# Patient Record
Sex: FEMALE | Race: WHITE | Employment: PART TIME | URBAN - METROPOLITAN AREA
[De-identification: names, ages, dates, MRNs, and addresses within clinical notes are randomized per-mention and may not be internally consistent; named-entity substitution may affect disease eponyms.]

---

## 2017-01-10 ENCOUNTER — TELEPHONE (OUTPATIENT)
Dept: INTERNAL MEDICINE CLINIC | Age: 52
End: 2017-01-10

## 2017-01-10 RX ORDER — PREDNISONE 10 MG/1
TABLET ORAL
Qty: 21 TABLET | Refills: 0 | Status: SHIPPED | OUTPATIENT
Start: 2017-01-10 | End: 2017-01-20

## 2017-01-26 ENCOUNTER — OFFICE VISIT (OUTPATIENT)
Dept: ENDOCRINOLOGY | Age: 52
End: 2017-01-26

## 2017-01-26 VITALS
SYSTOLIC BLOOD PRESSURE: 112 MMHG | HEIGHT: 68 IN | OXYGEN SATURATION: 98 % | DIASTOLIC BLOOD PRESSURE: 70 MMHG | BODY MASS INDEX: 25.1 KG/M2 | WEIGHT: 165.6 LBS | HEART RATE: 91 BPM

## 2017-01-26 DIAGNOSIS — E55.9 VITAMIN D DEFICIENCY: ICD-10-CM

## 2017-01-26 DIAGNOSIS — R23.2 HOT FLASHES: ICD-10-CM

## 2017-01-26 DIAGNOSIS — Z83.3 FAMILY HISTORY OF DIABETES MELLITUS (DM): ICD-10-CM

## 2017-01-26 DIAGNOSIS — E78.00 PURE HYPERCHOLESTEROLEMIA: Primary | ICD-10-CM

## 2017-01-26 PROCEDURE — 99214 OFFICE O/P EST MOD 30 MIN: CPT | Performed by: INTERNAL MEDICINE

## 2017-01-26 RX ORDER — ICOSAPENT ETHYL 1000 MG/1
2 CAPSULE ORAL 2 TIMES DAILY
Qty: 360 CAPSULE | Refills: 3 | Status: SHIPPED | OUTPATIENT
Start: 2017-01-26 | End: 2017-06-19 | Stop reason: SDUPTHER

## 2017-01-26 RX ORDER — ERGOCALCIFEROL 1.25 MG/1
CAPSULE ORAL
Qty: 26 CAPSULE | Refills: 1 | Status: SHIPPED | OUTPATIENT
Start: 2017-01-26 | End: 2017-03-30 | Stop reason: SDUPTHER

## 2017-01-26 RX ORDER — TOPIRAMATE 25 MG/1
CAPSULE, EXTENDED RELEASE ORAL
COMMUNITY
Start: 2016-12-21 | End: 2017-06-01

## 2017-01-26 ASSESSMENT — PATIENT HEALTH QUESTIONNAIRE - PHQ9
SUM OF ALL RESPONSES TO PHQ QUESTIONS 1-9: 0
1. LITTLE INTEREST OR PLEASURE IN DOING THINGS: 0
SUM OF ALL RESPONSES TO PHQ9 QUESTIONS 1 & 2: 0
2. FEELING DOWN, DEPRESSED OR HOPELESS: 0

## 2017-02-06 RX ORDER — DICYCLOMINE HYDROCHLORIDE 10 MG/1
CAPSULE ORAL
Qty: 270 CAPSULE | Refills: 0 | Status: SHIPPED | OUTPATIENT
Start: 2017-02-06 | End: 2017-03-31 | Stop reason: SDUPTHER

## 2017-02-28 PROBLEM — Z00.00 PREVENTATIVE HEALTH CARE: Status: ACTIVE | Noted: 2017-02-28

## 2017-02-28 ASSESSMENT — ENCOUNTER SYMPTOMS
SHORTNESS OF BREATH: 0
ABDOMINAL PAIN: 0

## 2017-03-08 ENCOUNTER — TELEPHONE (OUTPATIENT)
Dept: INTERNAL MEDICINE CLINIC | Age: 52
End: 2017-03-08

## 2017-03-30 DIAGNOSIS — E55.9 VITAMIN D DEFICIENCY: ICD-10-CM

## 2017-03-30 RX ORDER — ERGOCALCIFEROL 1.25 MG/1
CAPSULE ORAL
Qty: 20 CAPSULE | Refills: 1 | Status: SHIPPED | OUTPATIENT
Start: 2017-03-30 | End: 2017-06-19 | Stop reason: SDUPTHER

## 2017-03-31 ENCOUNTER — OFFICE VISIT (OUTPATIENT)
Dept: INTERNAL MEDICINE CLINIC | Age: 52
End: 2017-03-31

## 2017-03-31 VITALS
SYSTOLIC BLOOD PRESSURE: 118 MMHG | WEIGHT: 159 LBS | HEART RATE: 88 BPM | HEIGHT: 68 IN | BODY MASS INDEX: 24.1 KG/M2 | DIASTOLIC BLOOD PRESSURE: 72 MMHG | RESPIRATION RATE: 16 BRPM

## 2017-03-31 DIAGNOSIS — E78.00 PURE HYPERCHOLESTEROLEMIA: Primary | ICD-10-CM

## 2017-03-31 DIAGNOSIS — Z00.00 PREVENTATIVE HEALTH CARE: ICD-10-CM

## 2017-03-31 DIAGNOSIS — G43.019 MIGRAINE WITHOUT AURA, INTRACTABLE: ICD-10-CM

## 2017-03-31 PROCEDURE — 99396 PREV VISIT EST AGE 40-64: CPT | Performed by: INTERNAL MEDICINE

## 2017-03-31 RX ORDER — AZELASTINE 1 MG/ML
SPRAY, METERED NASAL
Qty: 1 BOTTLE | Refills: 5 | Status: SHIPPED | OUTPATIENT
Start: 2017-03-31 | End: 2018-06-21 | Stop reason: SDUPTHER

## 2017-03-31 RX ORDER — FLUTICASONE PROPIONATE 50 MCG
SPRAY, SUSPENSION (ML) NASAL
Qty: 1 BOTTLE | Refills: 5 | Status: SHIPPED | OUTPATIENT
Start: 2017-03-31 | End: 2018-05-14 | Stop reason: SDUPTHER

## 2017-03-31 RX ORDER — DICYCLOMINE HYDROCHLORIDE 10 MG/1
CAPSULE ORAL
Qty: 270 CAPSULE | Refills: 3 | Status: SHIPPED | OUTPATIENT
Start: 2017-03-31 | End: 2018-05-11 | Stop reason: SDUPTHER

## 2017-03-31 ASSESSMENT — ENCOUNTER SYMPTOMS
EYES NEGATIVE: 1
GASTROINTESTINAL NEGATIVE: 1
RESPIRATORY NEGATIVE: 1

## 2017-04-24 ENCOUNTER — TELEPHONE (OUTPATIENT)
Dept: INTERNAL MEDICINE CLINIC | Age: 52
End: 2017-04-24

## 2017-04-24 RX ORDER — TRAMADOL HYDROCHLORIDE 50 MG/1
50 TABLET ORAL 2 TIMES DAILY PRN
Qty: 30 TABLET | Refills: 0 | Status: SHIPPED | OUTPATIENT
Start: 2017-04-24 | End: 2020-10-13

## 2017-04-27 ENCOUNTER — TELEPHONE (OUTPATIENT)
Dept: INTERNAL MEDICINE CLINIC | Age: 52
End: 2017-04-27

## 2017-04-27 RX ORDER — METHYLPREDNISOLONE 4 MG/1
TABLET ORAL
Qty: 1 KIT | Refills: 0 | Status: SHIPPED | OUTPATIENT
Start: 2017-04-27 | End: 2017-05-01 | Stop reason: SDUPTHER

## 2017-04-28 ENCOUNTER — HOSPITAL ENCOUNTER (OUTPATIENT)
Dept: MAMMOGRAPHY | Age: 52
Discharge: HOME | End: 2017-04-28
Attending: INTERNAL MEDICINE | Admitting: INTERNAL MEDICINE

## 2017-04-28 DIAGNOSIS — Z12.31 VISIT FOR SCREENING MAMMOGRAM: ICD-10-CM

## 2017-05-01 ENCOUNTER — TELEPHONE (OUTPATIENT)
Dept: INTERNAL MEDICINE CLINIC | Age: 52
End: 2017-05-01

## 2017-05-01 RX ORDER — METHYLPREDNISOLONE 4 MG/1
TABLET ORAL
Qty: 1 KIT | Refills: 0 | Status: SHIPPED | OUTPATIENT
Start: 2017-05-01 | End: 2017-05-07

## 2017-05-17 ENCOUNTER — TELEPHONE (OUTPATIENT)
Dept: ENDOCRINOLOGY | Age: 52
End: 2017-05-17

## 2017-05-26 ENCOUNTER — TELEPHONE (OUTPATIENT)
Dept: ENDOCRINOLOGY | Age: 52
End: 2017-05-26

## 2017-06-01 ENCOUNTER — OFFICE VISIT (OUTPATIENT)
Dept: ENDOCRINOLOGY | Age: 52
End: 2017-06-01

## 2017-06-01 VITALS
BODY MASS INDEX: 23.13 KG/M2 | SYSTOLIC BLOOD PRESSURE: 108 MMHG | HEIGHT: 68 IN | HEART RATE: 77 BPM | OXYGEN SATURATION: 99 % | WEIGHT: 152.6 LBS | DIASTOLIC BLOOD PRESSURE: 74 MMHG

## 2017-06-01 DIAGNOSIS — E55.9 VITAMIN D DEFICIENCY: ICD-10-CM

## 2017-06-01 DIAGNOSIS — Z00.00 PREVENTATIVE HEALTH CARE: Primary | ICD-10-CM

## 2017-06-01 DIAGNOSIS — E78.00 PURE HYPERCHOLESTEROLEMIA: ICD-10-CM

## 2017-06-01 DIAGNOSIS — N95.1 MENOPAUSAL STATE: ICD-10-CM

## 2017-06-01 PROCEDURE — 99214 OFFICE O/P EST MOD 30 MIN: CPT | Performed by: INTERNAL MEDICINE

## 2017-06-01 RX ORDER — TOPIRAMATE 50 MG/1
TABLET, FILM COATED ORAL
COMMUNITY
Start: 2017-05-30

## 2017-06-01 RX ORDER — SUMATRIPTAN 50 MG/1
50 TABLET, FILM COATED ORAL
COMMUNITY

## 2017-06-01 ASSESSMENT — PATIENT HEALTH QUESTIONNAIRE - PHQ9
1. LITTLE INTEREST OR PLEASURE IN DOING THINGS: 0
SUM OF ALL RESPONSES TO PHQ QUESTIONS 1-9: 0
SUM OF ALL RESPONSES TO PHQ9 QUESTIONS 1 & 2: 0
2. FEELING DOWN, DEPRESSED OR HOPELESS: 0

## 2017-06-19 DIAGNOSIS — E55.9 VITAMIN D DEFICIENCY: ICD-10-CM

## 2017-06-19 RX ORDER — ERGOCALCIFEROL 1.25 MG/1
CAPSULE ORAL
Qty: 6 CAPSULE | Refills: 1 | Status: SHIPPED | OUTPATIENT
Start: 2017-06-19

## 2017-06-19 RX ORDER — ICOSAPENT ETHYL 1000 MG/1
2 CAPSULE ORAL 2 TIMES DAILY
Qty: 120 CAPSULE | Refills: 1 | Status: SHIPPED | OUTPATIENT
Start: 2017-06-19

## 2017-07-11 ENCOUNTER — TELEPHONE (OUTPATIENT)
Dept: DERMATOLOGY | Age: 52
End: 2017-07-11

## 2017-08-31 ENCOUNTER — OFFICE VISIT (OUTPATIENT)
Dept: INTERNAL MEDICINE CLINIC | Age: 52
End: 2017-08-31

## 2017-08-31 VITALS
BODY MASS INDEX: 23.95 KG/M2 | DIASTOLIC BLOOD PRESSURE: 70 MMHG | SYSTOLIC BLOOD PRESSURE: 130 MMHG | WEIGHT: 158 LBS | HEART RATE: 86 BPM | RESPIRATION RATE: 16 BRPM | HEIGHT: 68 IN

## 2017-08-31 DIAGNOSIS — R51.9 RIGHT FACIAL PAIN: Primary | ICD-10-CM

## 2017-08-31 PROCEDURE — 99213 OFFICE O/P EST LOW 20 MIN: CPT | Performed by: INTERNAL MEDICINE

## 2017-08-31 RX ORDER — METHYLPREDNISOLONE 4 MG/1
TABLET ORAL
Qty: 1 KIT | Refills: 0 | Status: SHIPPED | OUTPATIENT
Start: 2017-08-31 | End: 2017-09-06

## 2017-10-17 ENCOUNTER — TELEPHONE (OUTPATIENT)
Dept: ENDOCRINOLOGY | Age: 52
End: 2017-10-17

## 2017-10-17 NOTE — TELEPHONE ENCOUNTER
Needs someone today to call and leave these numbers on voice mail if she doesn't answer  LDL   HDL  Total  Trygliserides

## 2017-10-24 ENCOUNTER — TELEPHONE (OUTPATIENT)
Dept: INTERNAL MEDICINE CLINIC | Age: 52
End: 2017-10-24

## 2017-10-24 NOTE — TELEPHONE ENCOUNTER
Patient is calling to let you know she had her flu, shingles and Tdap done this year at [facility] but she does not know the dates. ..

## 2017-12-04 ENCOUNTER — TELEPHONE (OUTPATIENT)
Dept: INTERNAL MEDICINE CLINIC | Age: 52
End: 2017-12-04

## 2017-12-04 RX ORDER — ESCITALOPRAM OXALATE 10 MG/1
10 TABLET ORAL EVERY MORNING
Qty: 30 TABLET | Refills: 3 | Status: SHIPPED | OUTPATIENT
Start: 2017-12-04 | End: 2020-02-19

## 2017-12-04 NOTE — TELEPHONE ENCOUNTER
Pt states she has been dealing with issues with her 80year old mom, and other family issues. She said she feels very stressed and she cries when she showers. She said that her IBS is flaring up and she has had migraines as well, from the stress.  She is asking if Dr Sanjuanita Brooks can order something for the stress    Pharmacy:  [address] [place] [address] - F [phone]      #[phone]

## 2018-04-10 ENCOUNTER — TELEPHONE (OUTPATIENT)
Dept: INTERNAL MEDICINE CLINIC | Age: 53
End: 2018-04-10

## 2018-04-10 RX ORDER — AMOXICILLIN 500 MG/1
500 CAPSULE ORAL 3 TIMES DAILY
Qty: 15 CAPSULE | Refills: 0 | Status: SHIPPED | OUTPATIENT
Start: 2018-04-10 | End: 2018-04-15

## 2018-05-01 ENCOUNTER — HOSPITAL ENCOUNTER (OUTPATIENT)
Dept: MAMMOGRAPHY | Age: 53
Discharge: HOME | End: 2018-05-01
Attending: OBSTETRICS & GYNECOLOGY | Admitting: OBSTETRICS & GYNECOLOGY

## 2018-05-01 DIAGNOSIS — Z12.31 ENCOUNTER FOR SCREENING MAMMOGRAM FOR BREAST CANCER: ICD-10-CM

## 2018-05-14 RX ORDER — FLUTICASONE PROPIONATE 50 MCG
SPRAY, SUSPENSION (ML) NASAL
Qty: 1 BOTTLE | Refills: 5 | Status: SHIPPED | OUTPATIENT
Start: 2018-05-14 | End: 2019-02-06

## 2018-05-14 RX ORDER — FLUTICASONE PROPIONATE 50 MCG
SPRAY, SUSPENSION (ML) NASAL
Qty: 1 BOTTLE | Refills: 5 | Status: SHIPPED | OUTPATIENT
Start: 2018-05-14 | End: 2019-02-06 | Stop reason: SDUPTHER

## 2018-06-13 ENCOUNTER — OFFICE VISIT (OUTPATIENT)
Dept: DERMATOLOGY | Age: 53
End: 2018-06-13

## 2018-06-13 DIAGNOSIS — L28.0 LICHEN SIMPLEX CHRONICUS: Primary | ICD-10-CM

## 2018-06-13 DIAGNOSIS — D23.30 DERMAL NEVUS OF FACE: ICD-10-CM

## 2018-06-13 PROCEDURE — 99214 OFFICE O/P EST MOD 30 MIN: CPT | Performed by: DERMATOLOGY

## 2018-06-13 RX ORDER — TRIAMCINOLONE ACETONIDE 1 MG/G
CREAM TOPICAL
Qty: 30 G | Refills: 0 | Status: SHIPPED | OUTPATIENT
Start: 2018-06-13 | End: 2020-10-13

## 2018-06-21 RX ORDER — AZELASTINE 1 MG/ML
SPRAY, METERED NASAL
Qty: 1 BOTTLE | Refills: 4 | Status: SHIPPED | OUTPATIENT
Start: 2018-06-21 | End: 2019-08-06 | Stop reason: SDUPTHER

## 2018-08-09 RX ORDER — DICYCLOMINE HYDROCHLORIDE 10 MG/1
CAPSULE ORAL
Qty: 270 CAPSULE | Refills: 2 | Status: SHIPPED | OUTPATIENT
Start: 2018-08-09 | End: 2018-11-05 | Stop reason: SDUPTHER

## 2018-09-26 PROBLEM — Z00.00 PREVENTATIVE HEALTH CARE: Status: RESOLVED | Noted: 2017-02-28 | Resolved: 2018-09-26

## 2018-11-19 ENCOUNTER — OFFICE VISIT (OUTPATIENT)
Dept: ORTHOPEDIC SURGERY | Age: 53
End: 2018-11-19
Payer: COMMERCIAL

## 2018-11-19 VITALS
BODY MASS INDEX: 23.64 KG/M2 | SYSTOLIC BLOOD PRESSURE: 134 MMHG | DIASTOLIC BLOOD PRESSURE: 85 MMHG | HEART RATE: 93 BPM | HEIGHT: 68 IN | WEIGHT: 156 LBS

## 2018-11-19 DIAGNOSIS — R52 PAIN: Primary | ICD-10-CM

## 2018-11-19 PROCEDURE — 99213 OFFICE O/P EST LOW 20 MIN: CPT | Performed by: PHYSICIAN ASSISTANT

## 2018-11-19 RX ORDER — METHYLPREDNISOLONE 4 MG/1
TABLET ORAL
Qty: 1 KIT | Refills: 0 | Status: SHIPPED | OUTPATIENT
Start: 2018-11-19 | End: 2018-11-25

## 2018-11-28 ENCOUNTER — HOSPITAL ENCOUNTER (OUTPATIENT)
Dept: PHYSICAL THERAPY | Age: 53
Setting detail: THERAPIES SERIES
Discharge: HOME | End: 2018-11-28
Payer: COMMERCIAL

## 2018-11-28 PROCEDURE — 97161 PT EVAL LOW COMPLEX 20 MIN: CPT | Performed by: PHYSICAL THERAPIST

## 2018-11-28 PROCEDURE — 97140 MANUAL THERAPY 1/> REGIONS: CPT | Performed by: PHYSICAL THERAPIST

## 2018-11-28 PROCEDURE — G8985 CARRY GOAL STATUS: HCPCS | Performed by: PHYSICAL THERAPIST

## 2018-11-28 PROCEDURE — G8984 CARRY CURRENT STATUS: HCPCS | Performed by: PHYSICAL THERAPIST

## 2018-11-28 PROCEDURE — 97110 THERAPEUTIC EXERCISES: CPT | Performed by: PHYSICAL THERAPIST

## 2018-11-28 NOTE — FLOWSHEET NOTE
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, LBP S78.4  Insurance/Certification information:  PT Insurance Information: 18 - HU[employer] - $30CP - 100% - 20PT - NO AUTH  Physician Information:  Referring Practitioner: Richard Vines [address] [place] [address] signed (Y/N):     Date of Patient follow up with Physician:     G-Code (if applicable):      Date G-Code Applied:  18  PT G-Codes  Functional Assessment Tool Used: LEFS  Score: 40%  Functional Limitation: Carrying, moving and handling objects  Carrying, Moving and Handling Objects Current Status (): At least 40 percent but less than 60 percent impaired, limited or restricted  Carrying, Moving and Handling Objects Goal Status ():  At least 20 percent but less than 40 percent impaired, limited or restricted    Progress Note: [x]  Yes  []  No  Next due by: Visit #10       Latex Allergy:  [x]NO      []YES  Preferred Language for Healthcare:   [x]English       []other:    Visit # Insurance Allowable Requires auth   1 20    [x]no        []yes:       Pain level:  5/10     SUBJECTIVE:  See eval    OBJECTIVE: See eval  Observation:   Test measurements:      RESTRICTIONS/PRECAUTIONS: cervical fusion C5-6-7    Exercises/Interventions:     Therapeutic Ex Sets/sec Notes   Self correction  5\" x10 HEP   Supine clam B, R, L w/ TA Blue TB x15 ea HEP   SL clam, rev clam x15 ea R/L HEP                                      Pt ed: POC, HEP, activity mod, SI belt use, posture, lifting mechanics, heat vs ice, foot wear x15'    Manual Intervention     STM L>R paraspinals, L SI

## 2018-11-28 NOTE — PLAN OF CARE
with Lumbar instability/stabilization subgroup. []Signs/symptoms consistent with Lumbar mobilization/manipulation subgroup, myotomes and dermatomes intact. Meets manipulation criteria. []Signs/symptoms consistent with Lumbar direction specific/centralization subgroup   []Signs/symptoms consistent with Lumbar traction subgroup     []Signs/symptoms consistent with lumbar facet dysfunction   []Signs/symptoms consistent with lumbar stenosis type dysfunction   []Signs/symptoms consistent with nerve root involvement including myotome & dermatome dysfunction   []Signs/symptoms consistent with post-surgical status including: decreased ROM, strength and function. []signs/symptoms consistent with pathology which may benefit from Dry needling     []other:      Prognosis/Rehab Potential:      []Excellent   [x]Good    []Fair   []Poor    Tolerance of evaluation/treatment:    []Excellent   [x]Good    []Fair   []Poor  Physical Therapy Evaluation Complexity Justification  [x] A history of present problem with:  [] no personal factors and/or comorbidities that impact the plan of care;  [x]1-2 personal factors and/or comorbidities that impact the plan of care  []3 personal factors and/or comorbidities that impact the plan of care  [x] An examination of body systems using standardized tests and measures addressing any of the following: body structures and functions (impairments), activity limitations, and/or participation restrictions;:  [x] a total of 1-2 or more elements   [] a total of 3 or more elements   [] a total of 4 or more elements   [x] A clinical presentation with:  [x] stable and/or uncomplicated characteristics   [] evolving clinical presentation with changing characteristics  [] unstable and unpredictable characteristics;   [x] Clinical decision making of [x] low, [] moderate, [] high complexity using standardized patient assessment instrument and/or measurable assessment of functional outcome.     [x] EVAL

## 2018-12-04 ENCOUNTER — HOSPITAL ENCOUNTER (OUTPATIENT)
Dept: PHYSICAL THERAPY | Age: 53
Setting detail: THERAPIES SERIES
Discharge: HOME | End: 2018-12-04
Payer: COMMERCIAL

## 2018-12-04 PROCEDURE — 97110 THERAPEUTIC EXERCISES: CPT | Performed by: PHYSICAL THERAPIST

## 2018-12-04 PROCEDURE — 97140 MANUAL THERAPY 1/> REGIONS: CPT | Performed by: PHYSICAL THERAPIST

## 2018-12-04 PROCEDURE — 97112 NEUROMUSCULAR REEDUCATION: CPT | Performed by: PHYSICAL THERAPIST

## 2018-12-05 NOTE — FLOWSHEET NOTE
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, LBP H99.8  Insurance/Certification information:  PT Insurance Information: 18 - [employer] - $30CP - 100% - 20PT - NO AUTH  Physician Information:  Referring Practitioner: Mckenna Galarza [address] [place] [address] signed (Y/N):     Date of Patient follow up with Physician:     G-Code (if applicable):      Date G-Code Applied:  18  PT G-Codes  Functional Assessment Tool Used: LEFS  Score: 40%  Functional Limitation: Carrying, moving and handling objects  Carrying, Moving and Handling Objects Current Status (): At least 40 percent but less than 60 percent impaired, limited or restricted  Carrying, Moving and Handling Objects Goal Status (): At least 20 percent but less than 40 percent impaired, limited or restricted    Progress Note: [x]  Yes  []  No  Next due by: Visit #10       Latex Allergy:  [x]NO      []YES  Preferred Language for Healthcare:   [x]English       []other:    Visit # Insurance Allowable Requires auth   2 20    [x]no        []yes:       Pain level:  5/10     SUBJECTIVE:  Pt reports that she is feeling a lot better. She had a few days off work which helped. She has only needed to wear the stability belt off and on.      OBJECTIVE:   Observation:   Test measurements:  + supine to sit R LE long - short   RESTRICTIONS/PRECAUTIONS: cervical fusion C5-6-7    Exercises/Interventions:     Therapeutic Ex Sets/sec Notes   Self correction  5\" x10 HEP   Supine clam B, R, L w/ TA Green VSL x15 ea HEP   SL clam, rev clam x15 ea R/L HEP   Standing R hip flex return to lifting and carrying functional activities without increased symptoms or restriction. 5. Pt will be able to resume exercise progression at the gym without increased pain or instability. Progression Towards Functional goals:  [x] Patient is progressing as expected towards functional goals listed. [] Progression is slowed due to complexities listed. [] Progression has been slowed due to co-morbidities. [] Plan just implemented, too soon to assess goals progression  [] Other:     ASSESSMENT:  Pt had trouble isolating TrA and keeping pelvis stable. When she would activate gluts instead she got R SI pain. Overall, decrease pain and responded well to manuals.       Treatment/Activity Tolerance:  [x] Patient tolerated treatment well [] Patient limited by fatique  [] Patient limited by pain  [] Patient limited by other medical complications  [] Other:     Prognosis: [x] Good [] Fair  [] Poor    Patient Requires Follow-up: [x] Yes  [] No    PLAN: See michelle, 1-2x/week with HEP progressions, monitor SI stability  [x] Continue per plan of care [] Alter current plan (see comments)  [] Plan of care initiated [] Hold pending MD visit [] Discharge    Electronically signed by: Darryn Esposito PT, DPT #[id number]

## 2018-12-19 ENCOUNTER — HOSPITAL ENCOUNTER (OUTPATIENT)
Dept: PHYSICAL THERAPY | Age: 53
Setting detail: THERAPIES SERIES
Discharge: HOME | End: 2018-12-19
Payer: COMMERCIAL

## 2018-12-19 PROCEDURE — 97110 THERAPEUTIC EXERCISES: CPT | Performed by: PHYSICAL THERAPIST

## 2018-12-19 PROCEDURE — 97140 MANUAL THERAPY 1/> REGIONS: CPT | Performed by: PHYSICAL THERAPIST

## 2018-12-19 NOTE — FLOWSHEET NOTE
return to lifting and carrying functional activities without increased symptoms or restriction. 5. Pt will be able to resume exercise progression at the gym without increased pain or instability. Progression Towards Functional goals:  [x] Patient is progressing as expected towards functional goals listed. [] Progression is slowed due to complexities listed. [] Progression has been slowed due to co-morbidities. [] Plan just implemented, too soon to assess goals progression  [] Other:     ASSESSMENT:  Improved SI alignment following manuals. Decreased cues for TA and pelvic neutral, but increased with fatigue. Challenged with addition of side plank from knees, but no reported increased pain with progressions.       Treatment/Activity Tolerance:  [x] Patient tolerated treatment well [] Patient limited by fatique  [] Patient limited by pain  [] Patient limited by other medical complications  [] Other:     Prognosis: [x] Good [] Fair  [] Poor    Patient Requires Follow-up: [x] Yes  [] No    PLAN: See michelle, 1-2x/week with HEP progressions, monitor SI stability  [x] Continue per plan of care [] Alter current plan (see comments)  [] Plan of care initiated [] Hold pending MD visit [] Discharge    Electronically signed by: Jaciel Pennington PT, DPT #[id number]

## 2018-12-27 ENCOUNTER — HOSPITAL ENCOUNTER (OUTPATIENT)
Dept: PHYSICAL THERAPY | Age: 53
Setting detail: THERAPIES SERIES
Discharge: HOME | End: 2018-12-27
Payer: COMMERCIAL

## 2018-12-27 PROCEDURE — 97110 THERAPEUTIC EXERCISES: CPT | Performed by: PHYSICAL THERAPIST

## 2018-12-27 PROCEDURE — 97140 MANUAL THERAPY 1/> REGIONS: CPT | Performed by: PHYSICAL THERAPIST

## 2019-01-07 ENCOUNTER — HOSPITAL ENCOUNTER (OUTPATIENT)
Dept: PHYSICAL THERAPY | Age: 54
Setting detail: THERAPIES SERIES
End: 2019-01-07
Payer: COMMERCIAL

## 2019-01-08 ENCOUNTER — HOSPITAL ENCOUNTER (OUTPATIENT)
Dept: PHYSICAL THERAPY | Age: 54
Setting detail: THERAPIES SERIES
Discharge: HOME | End: 2019-01-08
Payer: COMMERCIAL

## 2019-01-08 PROCEDURE — G8984 CARRY CURRENT STATUS: HCPCS | Performed by: PHYSICAL THERAPIST

## 2019-01-08 PROCEDURE — 97110 THERAPEUTIC EXERCISES: CPT | Performed by: PHYSICAL THERAPIST

## 2019-01-08 PROCEDURE — G8985 CARRY GOAL STATUS: HCPCS | Performed by: PHYSICAL THERAPIST

## 2019-01-08 PROCEDURE — 97140 MANUAL THERAPY 1/> REGIONS: CPT | Performed by: PHYSICAL THERAPIST

## 2019-01-14 ENCOUNTER — HOSPITAL ENCOUNTER (OUTPATIENT)
Dept: PHYSICAL THERAPY | Age: 54
Setting detail: THERAPIES SERIES
Discharge: HOME | End: 2019-01-14
Payer: COMMERCIAL

## 2019-01-14 PROCEDURE — 97110 THERAPEUTIC EXERCISES: CPT | Performed by: PHYSICAL THERAPIST

## 2019-01-14 PROCEDURE — 97140 MANUAL THERAPY 1/> REGIONS: CPT | Performed by: PHYSICAL THERAPIST

## 2019-02-06 RX ORDER — FLUTICASONE PROPIONATE 50 MCG
SPRAY, SUSPENSION (ML) NASAL
Qty: 16 G | Refills: 4 | Status: SHIPPED | OUTPATIENT
Start: 2019-02-06 | End: 2019-08-06 | Stop reason: SDUPTHER

## 2019-02-13 PROBLEM — J06.9 URTI (ACUTE UPPER RESPIRATORY INFECTION): Status: ACTIVE | Noted: 2019-02-13

## 2019-02-21 ENCOUNTER — TELEPHONE (OUTPATIENT)
Dept: ORTHOPEDIC SURGERY | Age: 54
End: 2019-02-21

## 2019-03-28 PROBLEM — Z00.00 PREVENTATIVE HEALTH CARE: Status: RESOLVED | Noted: 2017-02-28 | Resolved: 2019-03-28

## 2019-04-25 ENCOUNTER — OFFICE VISIT (OUTPATIENT)
Dept: ORTHOPEDIC SURGERY | Age: 54
End: 2019-04-25
Payer: COMMERCIAL

## 2019-04-25 VITALS
HEART RATE: 79 BPM | HEIGHT: 68 IN | DIASTOLIC BLOOD PRESSURE: 80 MMHG | BODY MASS INDEX: 23.66 KG/M2 | WEIGHT: 156.09 LBS | SYSTOLIC BLOOD PRESSURE: 130 MMHG

## 2019-04-25 DIAGNOSIS — R07.81 RIB PAIN ON RIGHT SIDE: ICD-10-CM

## 2019-04-25 DIAGNOSIS — M25.511 RIGHT SHOULDER PAIN, UNSPECIFIED CHRONICITY: Primary | ICD-10-CM

## 2019-04-25 PROCEDURE — 99213 OFFICE O/P EST LOW 20 MIN: CPT | Performed by: PHYSICIAN ASSISTANT

## 2019-04-25 NOTE — PROGRESS NOTES
CHIEF COMPLAINT:    Chief Complaint   Patient presents with    Shoulder Pain     right clavicle noticed swelling on sunday       HISTORY OF PRESENT ILLNESS:                The patient is a 48 y.o. female   Past Medical History:   Diagnosis Date    Allergic rhinitis     Asthma     Mild, intermittent    Deviated septum 1998    Headache(784.0)     Hyperlipidemia     Osteoarthritis     Thoracic outlet syndrome 1987    TMJ (temporomandibular joint disorder)         The patient presents today for right clavicle evaluation. The patient is left-hand-dominant. She noticed some swelling around her right SC joint on Sunday. She denies any injuries that started the swelling. Since that time, she has noticed some discomfort inferior to her right SC joint. She denies chest pains. She denies any shortness of breath or difficulty taking deep breaths. She has a history of left-sided thoracic outlet syndrome with an associated surgery. She is unsure what they did with the surgery but states that they did not remove her 1st rib. She has a history of C5 through C7 spinal fusion. She also has a history of rheumatoid arthritis. She has treated her right sternoclavicular joint swelling with ice and Aleve. She currently works at the [employer].     The pain assessment was noted & is as follows:  Pain Assessment  Location of Pain: Other (Comment)(clavicle)  Location Modifiers: Right  Severity of Pain: 2  Quality of Pain: Aching, Dull  Duration of Pain: A few minutes  Frequency of Pain: Rarely  Aggravating Factors: Stretching, Bending  Limiting Behavior: Some  Relieving Factors: Rest  Result of Injury: No  Work-Related Injury: No  Are there other pain locations you wish to document?: No]      Past Medical History: Medical history form was reviewed today & can be found in the media tab  Past Medical History:   Diagnosis Date    Allergic rhinitis     Asthma     Mild, intermittent    Deviated septum 1998    Headache(784.0)     Hyperlipidemia     Osteoarthritis     Thoracic outlet syndrome     TMJ (temporomandibular joint disorder)       Past Surgical History:     Past Surgical History:   Procedure Laterality Date    CERVICAL DISCECTOMY  2011    lt C5-6--dr gonzalez      SECTION  5244,0771    COLONOSCOPY  2017    dr white--kaden 202     HYSTERECTOMY  2013    KNEE ARTHROSCOPY  2660,9624    Bilat knees    NASAL SEPTUM SURGERY  1998    OTHER SURGICAL HISTORY  1985    Thoracic Outlet Syndrome    SPINE SURGERY  6322,4340    C6-C7 discectomy/fusion,C5-C6    TONSILLECTOMY AND ADENOIDECTOMY      TONSILLECTOMY AND ADENOIDECTOMY       Current Medications:     Current Outpatient Medications:     fluconazole (DIFLUCAN) 150 MG tablet, Take one tablet oral route today and repeat in 3 days if symptoms persist, Disp: 1 tablet, Rfl: 0    fluticasone (FLONASE) 50 MCG/ACT nasal spray, SPRAY TWO SPRAYS IN EACH NOSTRIL DAILY, Disp: 16 g, Rfl: 4    dicyclomine (BENTYL) 20 MG tablet, Take 0.5 tablets by mouth 3 times daily as needed (ibs), Disp: 23 tablet, Rfl: 3    azelastine (ASTELIN) 0.1 % nasal spray, SPRAY ONE OR TWO SPRAYS IN EACH NOSTRIL TWICE DAILY AS NEEDED FOR ALLERGIES, Disp: 1 Bottle, Rfl: 4    triamcinolone (KENALOG) 0.1 % cream, Apply sparingly to affected area(s) bid prn for flares, up to 2 weeks at a time on any given area.  Do not apply on cleared skin., Disp: 30 g, Rfl: 0    escitalopram (LEXAPRO) 10 MG tablet, Take 1 tablet by mouth every morning, Disp: 30 tablet, Rfl: 3    pitavastatin (LIVALO) 2 MG TABS tablet, TAKE 1 TABLET DAILY, Disp: 30 tablet, Rfl: 1    Icosapent Ethyl (VASCEPA) 1 g CAPS capsule, Take 2 capsules by mouth 2 times daily, Disp: 120 capsule, Rfl: 1    vitamin D (ERGOCALCIFEROL) 45553 units CAPS capsule, TAKE ONE CAPSULE BY MOUTH every 10 days, Disp: 6 capsule, Rfl: 1    topiramate (TOPAMAX) 50 MG tablet, , Disp: , Rfl:     SUMAtriptan (IMITREX) 50 MG tablet, Take 50 mg by mouth once as needed for Migraine, Disp: , Rfl:     traMADol (ULTRAM) 50 MG tablet, Take 1 tablet by mouth 2 times daily as needed for Pain, Disp: 30 tablet, Rfl: 0    Dapsone (ACZONE) 5 % GEL, Apply to face BID., Disp: 90 g, Rfl: 2    promethazine (PHENERGAN) 25 MG tablet, Take 1 tablet by mouth every 6 hours as needed for Nausea for 30 doses. , Disp: 30 tablet, Rfl: 1    Misc Natural Products (OSTEO BI-FLEX TRIPLE STRENGTH PO), Take 2 tablets by mouth daily. , Disp: , Rfl:     Acetaminophen (TYLENOL PO), Take  by mouth. As needed for Mild Headaches, Disp: , Rfl:     cetirizine (ZYRTEC) 10 MG tablet, Take 10 mg by mouth daily. , Disp: , Rfl:   Allergies:  Crestor [rosuvastatin calcium]; Erythromycin; Percocet [oxycodone-acetaminophen]; Relpax [eletriptan]; Tolectin [tolmetin]; and Entex t  [pseudoephedrine-guaifenesin]  Social History:    reports that she has never smoked. She has never used smokeless tobacco. She reports that she drinks alcohol. She reports that she does not use drugs. Family History:   Family History   Problem Relation Age of Onset    High Cholesterol Mother     Heart Disease Mother     Diabetes Mother         Type II    Coronary Art Dis Mother     Neuropathy Mother     Diabetes Father         TypeII    High Cholesterol Father     Lung Cancer Father     Hypertension Father     Alcohol Abuse Father     Cancer Father     High Cholesterol Other        REVIEW OF SYSTEMS:   Pertinent items are noted in HPI  Review of systems reviewed from Patient History Form dated on April 25, 2019 and available in the patient's chart under the Media tab.      CONSTITUTIONAL: Denies unexplained weight loss, fevers, chills or fatigue  NEUROLOGICAL: Denies unsteady gait or progressive weakness  SKIN: Denies skin changes, delayed healing, rash, itching     PHYSICAL EXAMINATION:   Vitals: Blood pressure 130/80, pulse 79, height 5' 7.99\" (1.727 m), weight 156 lb 1.4 oz (70.8 kg), last menstrual period 10/02/2012, not currently breastfeeding. GENERAL EXAM:  ? General Apparence: Patient is adequately groomed with no evidence of malnutrition. ? Orientation: The patient is oriented to time, place and person. ? Mood & Affect:The patient's mood and affect are appropriate     PHYSICAL EXAMINATION:  Inspection of the right clavicle reveals warm, dry, intact skin. There is no adenopathy. The distal neurovascular exam is grossly intact. There is prominence about the sternoclavicular joint. There is swelling noted just inferior to the right SC joint. There is no palpable tenderness over the sternoclavicular joint or clavicular shaft. There is tenderness over the 1st rib both superior to the clavicle and inferior to the clavicle at the manubrium. Examination of the contralateral clavicle reveals no atrophy or deformity. The skin is warm and dry. There is a well-healed incision. Range of motion is within normal limits. There is no focal tenderness with palpation. Provocative SLAP, biceps tension, apprehension AC joint or rotator cuff tests are negative. Strength is graded 5/5 in all muscle groups. The distal neurovascular exam is grossly intact. Cervical spine: The skin is warm and dry. There is no swelling, warmth, or erythema. Range of motion is limited in extension, right and left rotation, and right and left lateral flexion, secondary to his cervical fusion. There is no paraspinal or spinous process tenderness. Spurling's sign is negative and did not produce shoulder pain. The distal neurovascular exam is grossly intact. X-RAYS: 2 views of the right clavicle were obtained in the office and reviewed today. Numbers bony reaction noted over the distal portion of the 1st rib. There are no fractures about the right clavicle. There is hypertrophy of the proximal aspect of the right clavicle at the sternoclavicular joint.   Lung fields are clear with normal pulmonary markings. ASSESSMENT:  Right 1st rib pain    PLAN: I did detailed discussion with Sahara Ewing. I recommended continued use of ice and anti-inflammatory medications to help decrease her discomfort. I also recommended a follow-up evaluation with Pawan Jerez MD.  If Dr. Carlos Ro feels that her symptoms are not related to her 1st rib and more related to her sternoclavicular joint, I recommended a follow-up evaluation with Dr. Josie Potts. She agreed with this plan.         Angelika Jensen ATC, PA-C

## 2019-04-30 PROBLEM — M89.8X1 PAIN OF RIGHT CLAVICLE: Status: ACTIVE | Noted: 2019-04-30

## 2019-05-17 ENCOUNTER — OFFICE VISIT (OUTPATIENT)
Dept: VASCULAR SURGERY | Age: 54
End: 2019-05-17
Payer: COMMERCIAL

## 2019-05-17 VITALS
HEIGHT: 68 IN | WEIGHT: 155 LBS | DIASTOLIC BLOOD PRESSURE: 80 MMHG | BODY MASS INDEX: 23.49 KG/M2 | SYSTOLIC BLOOD PRESSURE: 100 MMHG

## 2019-05-17 DIAGNOSIS — M89.8X1 PAIN OF RIGHT CLAVICLE: Primary | ICD-10-CM

## 2019-05-17 PROCEDURE — 99243 OFF/OP CNSLTJ NEW/EST LOW 30: CPT | Performed by: SURGERY

## 2019-05-17 SDOH — HEALTH STABILITY: MENTAL HEALTH: HOW OFTEN DO YOU HAVE A DRINK CONTAINING ALCOHOL?: MONTHLY OR LESS

## 2019-05-17 NOTE — PROGRESS NOTES
Outpatient Consultation / H&P    Date of Consultation:  2019    PCP:  Brian Pritchard MD     Referring Provider:  JEFF Blanco     Chief Complaint:   Chief Complaint   Patient presents with    Other     patient ref by Rowena Harrison for clavical first rib resection./possible. pamlr        History of Present Illness: We are asked to see this patient in consultation by JEFF Blanco regarding possible thoracic outlet. Reg Valdivia is a 48 y.o. female who reports noticeable swelling around the Right SternoClavicular joint. She also has some infraclavicular pain. No trauma. She denies pain in shoulder or arm. No arm swelling, No arm fatigue. Prior history of left TOS- treated with what appears to be scalenectomy only. Past Medical History:  Past Medical History:   Diagnosis Date    Allergic rhinitis     Asthma     Mild, intermittent    Deviated septum     Headache(784.0)     Hyperlipidemia     Osteoarthritis     Thoracic outlet syndrome     TMJ (temporomandibular joint disorder)        Past Surgical History:  Past Surgical History:   Procedure Laterality Date    CERVICAL DISCECTOMY  2011    lt C5-6--dr gonzalez      SECTION  6406,6182    COLONOSCOPY  2017    dr white--kaden      HYSTERECTOMY  2013    KNEE ARTHROSCOPY  0993,7218    Bilat knees    NASAL SEPTUM SURGERY  1998    OTHER SURGICAL HISTORY  1985    Thoracic Outlet Syndrome    SPINE SURGERY  7384,6494    C6-C7 discectomy/fusion,C5-C6    TONSILLECTOMY AND ADENOIDECTOMY      TONSILLECTOMY AND ADENOIDECTOMY         Home Medications:   Prior to Admission medications    Medication Sig Start Date End Date Taking?  Authorizing Provider   fluconazole (DIFLUCAN) 150 MG tablet Take one tablet oral route today and repeat in 3 days if symptoms persist 19  Yes KAVIN Rosales - CNP   fluticasone (FLONASE) 50 MCG/ACT nasal spray SPRAY TWO SPRAYS IN EACH NOSTRIL DAILY 19 Yes Steffi Basurto MD   dicyclomine (BENTYL) 20 MG tablet Take 0.5 tablets by mouth 3 times daily as needed (ibs) 11/7/18  Yes Shaniqua Chappell MD   azelastine (ASTELIN) 0.1 % nasal spray SPRAY ONE OR TWO SPRAYS IN EACH NOSTRIL TWICE DAILY AS NEEDED FOR ALLERGIES 6/21/18  Yes Steffi Basurto MD   triamcinolone (KENALOG) 0.1 % cream Apply sparingly to affected area(s) bid prn for flares, up to 2 weeks at a time on any given area. Do not apply on cleared skin. 6/13/18  Yes Jarocho Tomas MD   escitalopram (LEXAPRO) 10 MG tablet Take 1 tablet by mouth every morning 12/4/17  Yes Steffi Basurto MD   pitavastatin (LIVALO) 2 MG TABS tablet TAKE 1 TABLET DAILY 6/19/17  Yes KAVIN Mercer CNP   Icosapent Ethyl (VASCEPA) 1 g CAPS capsule Take 2 capsules by mouth 2 times daily 6/19/17  Yes KAVIN Mercer CNP   vitamin D (ERGOCALCIFEROL) 32859 units CAPS capsule TAKE ONE CAPSULE BY MOUTH every 10 days 6/19/17  Yes KAVIN Mercer CNP   topiramate (TOPAMAX) 50 MG tablet  5/30/17  Yes Historical Provider, MD   SUMAtriptan (IMITREX) 50 MG tablet Take 50 mg by mouth once as needed for Migraine   Yes Historical Provider, MD   traMADol (ULTRAM) 50 MG tablet Take 1 tablet by mouth 2 times daily as needed for Pain 4/24/17  Yes Steffi Basurto MD   Dapsone (ACZONE) 5 % GEL Apply to face BID. 8/25/16  Yes Jarocho Tomas MD   promethazine (PHENERGAN) 25 MG tablet Take 1 tablet by mouth every 6 hours as needed for Nausea for 30 doses. 2/24/14  Yes Steffi Basurto MD   Misc Natural Products (OSTEO BI-FLEX TRIPLE STRENGTH PO) Take 2 tablets by mouth daily. Yes Historical Provider, MD   Acetaminophen (TYLENOL PO) Take  by mouth. As needed for Mild Headaches   Yes Historical Provider, MD   cetirizine (ZYRTEC) 10 MG tablet Take 10 mg by mouth daily. Yes Historical Provider, MD        Allergies:  Crestor [rosuvastatin calcium]; Erythromycin; Percocet [oxycodone-acetaminophen]; Relpax [eletriptan];  Tolectin [tolmetin]; and Entex t  [pseudoephedrine-guaifenesin]      Social History:      Social History     Socioeconomic History    Marital status:      Spouse name: Not on file    Number of children: Not on file    Years of education: Not on file    Highest education level: Not on file   Occupational History    Not on file   Social Needs    Financial resource strain: Not on file    Food insecurity:     Worry: Not on file     Inability: Not on file    Transportation needs:     Medical: Not on file     Non-medical: Not on file   Tobacco Use    Smoking status: Never Smoker    Smokeless tobacco: Never Used   Substance and Sexual Activity    Alcohol use: Yes     Frequency: Monthly or less     Comment: Rarely    Drug use: No    Sexual activity: Yes     Partners: Male   Lifestyle    Physical activity:     Days per week: Not on file     Minutes per session: Not on file    Stress: Not on file   Relationships    Social connections:     Talks on phone: Not on file     Gets together: Not on file     Attends Spiritism service: Not on file     Active member of club or organization: Not on file     Attends meetings of clubs or organizations: Not on file     Relationship status: Not on file    Intimate partner violence:     Fear of current or ex partner: Not on file     Emotionally abused: Not on file     Physically abused: Not on file     Forced sexual activity: Not on file   Other Topics Concern    Not on file   Social History Narrative    ** Merged History Encounter **            Family History:        Problem Relation Age of Onset    High Cholesterol Mother     Heart Disease Mother     Diabetes Mother         Type II    Coronary Art Dis Mother     Neuropathy Mother     Diabetes Father         TypeII    High Cholesterol Father     Lung Cancer Father     Hypertension Father     Alcohol Abuse Father     Cancer Father     High Cholesterol Other        Review of Systems:  A 14 point review of systems was

## 2019-05-20 ENCOUNTER — TELEPHONE (OUTPATIENT)
Dept: VASCULAR SURGERY | Age: 54
End: 2019-05-20

## 2019-05-20 NOTE — TELEPHONE ENCOUNTER
Called patient with date and time of cta chest at [place]. Arrive at 7:15am/7:30am test, May 28, 2019. NPO 4 hours prior.  pamtanya

## 2019-05-28 ENCOUNTER — HOSPITAL ENCOUNTER (OUTPATIENT)
Dept: CT IMAGING | Age: 54
Discharge: HOME | End: 2019-05-28
Payer: COMMERCIAL

## 2019-05-28 DIAGNOSIS — M89.8X1 PAIN OF RIGHT CLAVICLE: ICD-10-CM

## 2019-05-28 PROCEDURE — 71260 CT THORAX DX C+: CPT

## 2019-05-28 PROCEDURE — 6360000004 HC RX CONTRAST MEDICATION: Performed by: SURGERY

## 2019-05-28 RX ADMIN — IOPAMIDOL 75 ML: 755 INJECTION, SOLUTION INTRAVENOUS at 08:03

## 2019-05-29 ENCOUNTER — TELEPHONE (OUTPATIENT)
Dept: VASCULAR SURGERY | Age: 54
End: 2019-05-29

## 2019-05-29 NOTE — TELEPHONE ENCOUNTER
Called patient with results of ct per Dr Nenita Carpenter. Normal Ct results. Needs to go back to orthopedics.  azeem

## 2019-06-10 ENCOUNTER — HOSPITAL ENCOUNTER (OUTPATIENT)
Dept: WOMENS IMAGING | Age: 54
Discharge: HOME | End: 2019-06-10
Payer: COMMERCIAL

## 2019-06-10 DIAGNOSIS — Z12.31 ENCOUNTER FOR SCREENING MAMMOGRAM FOR BREAST CANCER: ICD-10-CM

## 2019-06-10 PROCEDURE — 77063 BREAST TOMOSYNTHESIS BI: CPT

## 2020-02-17 PROBLEM — R05.9 COUGH: Status: ACTIVE | Noted: 2020-02-17

## 2020-03-18 PROBLEM — R05.9 COUGH: Status: RESOLVED | Noted: 2020-02-17 | Resolved: 2020-03-18

## 2020-07-22 ENCOUNTER — HOSPITAL ENCOUNTER (OUTPATIENT)
Dept: WOMENS IMAGING | Age: 55
Discharge: HOME | End: 2020-07-22
Payer: COMMERCIAL

## 2020-07-22 PROCEDURE — 77063 BREAST TOMOSYNTHESIS BI: CPT

## 2020-10-12 ENCOUNTER — APPOINTMENT (OUTPATIENT)
Dept: CT IMAGING | Age: 55
End: 2020-10-12
Payer: COMMERCIAL

## 2020-10-12 ENCOUNTER — APPOINTMENT (OUTPATIENT)
Dept: GENERAL RADIOLOGY | Age: 55
End: 2020-10-12
Payer: COMMERCIAL

## 2020-10-12 ENCOUNTER — HOSPITAL ENCOUNTER (EMERGENCY)
Age: 55
Discharge: HOME | End: 2020-10-12
Payer: COMMERCIAL

## 2020-10-12 VITALS
OXYGEN SATURATION: 96 % | SYSTOLIC BLOOD PRESSURE: 128 MMHG | HEART RATE: 99 BPM | RESPIRATION RATE: 25 BRPM | DIASTOLIC BLOOD PRESSURE: 81 MMHG | TEMPERATURE: 98.8 F

## 2020-10-12 LAB
A/G RATIO: 1.4 (ref 1.1–2.2)
ALBUMIN SERPL-MCNC: 4.5 G/DL (ref 3.4–5)
ALP BLD-CCNC: 85 U/L (ref 40–129)
ALT SERPL-CCNC: 34 U/L (ref 10–40)
ANION GAP SERPL CALCULATED.3IONS-SCNC: 13 MMOL/L (ref 3–16)
AST SERPL-CCNC: 33 U/L (ref 15–37)
BASOPHILS ABSOLUTE: 0 K/UL (ref 0–0.2)
BASOPHILS RELATIVE PERCENT: 0.5 %
BILIRUB SERPL-MCNC: 0.4 MG/DL (ref 0–1)
BUN BLDV-MCNC: 11 MG/DL (ref 7–20)
CALCIUM SERPL-MCNC: 9.3 MG/DL (ref 8.3–10.6)
CHLORIDE BLD-SCNC: 101 MMOL/L (ref 99–110)
CO2: 25 MMOL/L (ref 21–32)
CREAT SERPL-MCNC: 0.8 MG/DL (ref 0.6–1.1)
D DIMER: 408 NG/ML DDU (ref 0–229)
EKG ATRIAL RATE: 86 BPM
EKG DIAGNOSIS: NORMAL
EKG P AXIS: 49 DEGREES
EKG P-R INTERVAL: 132 MS
EKG Q-T INTERVAL: 356 MS
EKG QRS DURATION: 82 MS
EKG QTC CALCULATION (BAZETT): 426 MS
EKG R AXIS: 61 DEGREES
EKG T AXIS: 42 DEGREES
EKG VENTRICULAR RATE: 86 BPM
EOSINOPHILS ABSOLUTE: 0 K/UL (ref 0–0.6)
EOSINOPHILS RELATIVE PERCENT: 0.4 %
GFR AFRICAN AMERICAN: >60
GFR NON-AFRICAN AMERICAN: >60
GLOBULIN: 3.2 G/DL
GLUCOSE BLD-MCNC: 128 MG/DL (ref 70–99)
HCT VFR BLD CALC: 43.9 % (ref 36–48)
HEMOGLOBIN: 14.9 G/DL (ref 12–16)
LYMPHOCYTES ABSOLUTE: 1.3 K/UL (ref 1–5.1)
LYMPHOCYTES RELATIVE PERCENT: 18.5 %
MAGNESIUM: 2.2 MG/DL (ref 1.8–2.4)
MCH RBC QN AUTO: 28.9 PG (ref 26–34)
MCHC RBC AUTO-ENTMCNC: 34 G/DL (ref 31–36)
MCV RBC AUTO: 84.9 FL (ref 80–100)
MONOCYTES ABSOLUTE: 0.4 K/UL (ref 0–1.3)
MONOCYTES RELATIVE PERCENT: 5.6 %
NEUTROPHILS ABSOLUTE: 5.2 K/UL (ref 1.7–7.7)
NEUTROPHILS RELATIVE PERCENT: 75 %
PDW BLD-RTO: 13.5 % (ref 12.4–15.4)
PLATELET # BLD: 254 K/UL (ref 135–450)
PMV BLD AUTO: 7.3 FL (ref 5–10.5)
POTASSIUM REFLEX MAGNESIUM: 3.3 MMOL/L (ref 3.5–5.1)
RBC # BLD: 5.18 M/UL (ref 4–5.2)
SODIUM BLD-SCNC: 139 MMOL/L (ref 136–145)
TOTAL PROTEIN: 7.7 G/DL (ref 6.4–8.2)
TROPONIN: <0.01 NG/ML
WBC # BLD: 7 K/UL (ref 4–11)

## 2020-10-12 PROCEDURE — 71260 CT THORAX DX C+: CPT

## 2020-10-12 PROCEDURE — 71045 X-RAY EXAM CHEST 1 VIEW: CPT

## 2020-10-12 PROCEDURE — 83735 ASSAY OF MAGNESIUM: CPT

## 2020-10-12 PROCEDURE — 93010 ELECTROCARDIOGRAM REPORT: CPT | Performed by: INTERNAL MEDICINE

## 2020-10-12 PROCEDURE — 99285 EMERGENCY DEPT VISIT HI MDM: CPT

## 2020-10-12 PROCEDURE — 80053 COMPREHEN METABOLIC PANEL: CPT

## 2020-10-12 PROCEDURE — U0003 INFECTIOUS AGENT DETECTION BY NUCLEIC ACID (DNA OR RNA); SEVERE ACUTE RESPIRATORY SYNDROME CORONAVIRUS 2 (SARS-COV-2) (CORONAVIRUS DISEASE [COVID-19]), AMPLIFIED PROBE TECHNIQUE, MAKING USE OF HIGH THROUGHPUT TECHNOLOGIES AS DESCRIBED BY CMS-2020-01-R: HCPCS

## 2020-10-12 PROCEDURE — 85025 COMPLETE CBC W/AUTO DIFF WBC: CPT

## 2020-10-12 PROCEDURE — 84484 ASSAY OF TROPONIN QUANT: CPT

## 2020-10-12 PROCEDURE — 36415 COLL VENOUS BLD VENIPUNCTURE: CPT

## 2020-10-12 PROCEDURE — 93005 ELECTROCARDIOGRAM TRACING: CPT | Performed by: PHYSICIAN ASSISTANT

## 2020-10-12 PROCEDURE — 6360000004 HC RX CONTRAST MEDICATION: Performed by: PHYSICIAN ASSISTANT

## 2020-10-12 PROCEDURE — 85379 FIBRIN DEGRADATION QUANT: CPT

## 2020-10-12 RX ADMIN — IOPAMIDOL 75 ML: 755 INJECTION, SOLUTION INTRAVENOUS at 18:19

## 2020-10-12 ASSESSMENT — PAIN DESCRIPTION - PAIN TYPE: TYPE: ACUTE PAIN

## 2020-10-12 ASSESSMENT — PAIN SCALES - GENERAL: PAINLEVEL_OUTOF10: 6

## 2020-10-12 NOTE — ED PROVIDER NOTES
The Ekg interpreted by me shows  normal sinus rhythm with a rate of 86  Axis is   Normal  QTc is  426  Intervals and Durations are unremarkable.       ST Segments: normal  No prior ekg for comparison    Interpreted EKG, patient not evaluated        Jolynn Nolan MD  10/12/20 [id number]

## 2020-10-12 NOTE — ED NOTES
Fall risk screening completed.  Fall risk bracelet applied to patient.  Non-skid socks provided and placed on patient. Ankit Gone fall risk is indicated using  dome light .  Based on score, a bed alarm was indicated and applied.  The call light is within the patient's reach, and instructions for use were provided.  The bed is in the lowest position with wheels locked.  The patient has been advised to notify staff, using the call light, if there is a need to get up or use restroom.  The patient verbalized understanding of safety precautions and how to contact staff for assistance.          Jone Inman, RN  10/12/20 7308

## 2020-10-13 ENCOUNTER — CARE COORDINATION (OUTPATIENT)
Dept: CARE COORDINATION | Age: 55
End: 2020-10-13

## 2020-10-13 ASSESSMENT — ENCOUNTER SYMPTOMS
VOMITING: 0
SORE THROAT: 1
FACIAL SWELLING: 0
BACK PAIN: 0
COUGH: 1
EYE PAIN: 0
SHORTNESS OF BREATH: 1
CHEST TIGHTNESS: 1
NAUSEA: 0
ABDOMINAL PAIN: 0

## 2020-10-13 NOTE — CARE COORDINATION
Chart reviewed. Pt seen and evaluated in ED for Viral pneumonia. Pt given the following referrals/recommendations (see below):    - Call Ari Guzman MD (Internal Medicine) in 1 day (10/13/2020)    Initial ED f/u call to pt (COVID pending 10/13/2020 at 7:56am)       Patient contacted regarding Nam Matute. Discussed COVID-19 related testing which was pending at this time. Test results were pending. Patient informed of results, if available? Aware results are pending    Care Transition Nurse/ Ambulatory Care Manager contacted the patient by telephone to perform post discharge assessment. Call within 2 business days of discharge: Yes. Verified name and  with patient as identifiers. Provided introduction to self, and explanation of the CTN/ACM role, and reason for call due to risk factors for infection and/or exposure to COVID-19. Symptoms reviewed with patient who verbalized the following symptoms: cough, shortness of breath and congested, less SOB than previous day. Having blood when blowing nose (has sinus). Due to no new or worsening symptoms encounter was not routed to provider for escalation. Discussed follow-up appointments. If no appointment was previously scheduled, appointment scheduling offered: Pt to call and see if PCP offers VV and schedule to go over symptoms and make sure she doesn't need anything for Viral PNA or her SOB/Cough. Medication list reviewed and cleaned per pt's request due to multiple medications being listed she no longer takes. Pt aware she will need to ask PCP about removing duplicate of Lexapro (both RX's were placed the same day in 2020)  [facility] follow up appointment(s): No future appointments. Non-[facility] follow up appointment(s): n/a    Non-face-to-face services provided:  Obtained and reviewed discharge summary and/or continuity of care documents     Advance Care Planning:   Does patient have an Advance Directive:  Per pt Laverne Garcia: spouse.    Patient has

## 2020-10-13 NOTE — ED PROVIDER NOTES
[facility]  ED  EMERGENCY DEPARTMENT ENCOUNTER        Pt Name: Kofi Dewey  MRN: [id number]  Jerzygfkrystal 1965  Date of evaluation: 10/12/2020  Provider: JESÚS Lemus  PCP: Lisette Aldana MD    CHEO. I have evaluated this patient. My supervising physician was available for consultation. CHIEF COMPLAINT       Chief Complaint   Patient presents with    Shortness of Breath     BETTS, ear pain worse in left, cough, sore throat, headache. Hx of asthma in youth, no other resp Hx. HISTORY OF PRESENT ILLNESS   (Location, Timing/Onset, Context/Setting, Quality, Duration, Modifying Factors, Severity, Associated Signs and Symptoms)  Note limiting factors. Kofi Dewey is a 50 y.o. female presents the emergency department for ear pain, cough, shortness of breath. Patient reports that on Monday of last week she began to have a sore throat, left ear pain for which she was evaluated in urgent care and diagnosed with an ear infection, prescribed Augmentin. She noted that she continued to have cough, notes she is sensation of difficulty taking deep inhalation and feels short of breath when she walks upstairs. Noted a fever on Wednesday, no fever since. Called her primary care doctor last week, who prescribed her prednisone and recommended return the emergency room if no improvement of symptoms by Monday. She denies wheezing, abdominal pain, nausea, vomiting, inability swallow solids or liquids, excessive drooling or spitting, tongue lip or throat swelling, neck pain or stiffness. Nursing Notes were all reviewed and agreed with or any disagreements were addressed in the HPI. REVIEW OF SYSTEMS    (2-9 systems for level 4, 10 or more for level 5)     Review of Systems   Constitutional: Positive for fever. HENT: Positive for ear pain and sore throat. Negative for drooling, ear discharge and facial swelling. Eyes: Negative for pain and visual disturbance. Respiratory: Positive for cough, chest tightness and shortness of breath. Cardiovascular: Negative for chest pain. Gastrointestinal: Negative for abdominal pain, nausea and vomiting. Genitourinary: Negative for dysuria and frequency. Musculoskeletal: Negative for back pain and neck pain. Skin: Negative for rash. Neurological: Negative for weakness, numbness and headaches. Psychiatric/Behavioral: Negative for confusion. Positives and Pertinent negatives as per HPI. Except as noted above in the ROS, all other systems were reviewed and negative.        PAST MEDICAL HISTORY     Past Medical History:   Diagnosis Date    Allergic rhinitis     Asthma     Mild, intermittent    Deviated septum     Headache(784.0)     Hyperlipidemia     Osteoarthritis     Thoracic outlet syndrome     TMJ (temporomandibular joint disorder)          SURGICAL HISTORY     Past Surgical History:   Procedure Laterality Date    CERVICAL DISCECTOMY  2011    lt C5-6--dr gonzalez      SECTION  7109,3398    COLONOSCOPY  2017    dr white--kaden      HYSTERECTOMY  2013    KNEE ARTHROSCOPY  9948,7563    Bilat knees    NASAL SEPTUM SURGERY      OTHER SURGICAL HISTORY  1985    Thoracic Outlet Syndrome    SPINE SURGERY  2683,5055    C6-C7 discectomy/fusion,C5-C6    TONSILLECTOMY AND ADENOIDECTOMY      TONSILLECTOMY AND ADENOIDECTOMY           [address]       Discharge Medication List as of 10/12/2020  7:04 PM      CONTINUE these medications which have NOT CHANGED    Details   dicyclomine (BENTYL) 10 MG capsule 1 po tid prn spasm, Disp-90 capsule,R-3Normal      predniSONE (DELTASONE) 10 MG tablet Take 1 tablet by mouth daily for 5 days, Disp-5 tablet,R-0Normal      fluticasone (FLONASE) 50 MCG/ACT nasal spray SPRAY TWO SPRAYS IN EACH NOSTRIL DAILY, Disp-16 g,R-4Normal      azelastine (ASTELIN) 0.1 % nasal spray SPRAY ONE OR TWO SPRAYS IN EACH NOSTRIL TWICE DAILY AS NEEDED FOR ALLERGIES, Disp-1 Bottle,R-3Normal      !! escitalopram (LEXAPRO) 5 MG tablet TAKE ONE TABLET BY MOUTH DAILY, Disp-30 tablet,R-2Normal      !! escitalopram (LEXAPRO) 5 MG tablet Take 1 tablet by mouth daily, Disp-30 tablet,R-3Normal      fluconazole (DIFLUCAN) 150 MG tablet Take one tablet oral route today and repeat in 3 days if symptoms persist, Disp-1 tablet, R-0Normal      triamcinolone (KENALOG) 0.1 % cream Apply sparingly to affected area(s) bid prn for flares, up to 2 weeks at a time on any given area. Do not apply on cleared skin., Disp-30 g, R-0, Normal      pitavastatin (LIVALO) 2 MG TABS tablet TAKE 1 TABLET DAILY, Disp-30 tablet, R-1Normal      Icosapent Ethyl (VASCEPA) 1 g CAPS capsule Take 2 capsules by mouth 2 times daily, Disp-120 capsule, R-1Normal      vitamin D (ERGOCALCIFEROL) 77579 units CAPS capsule TAKE ONE CAPSULE BY MOUTH every 10 days, Disp-6 capsule, R-1Normal      topiramate (TOPAMAX) 50 MG tablet Historical Med      SUMAtriptan (IMITREX) 50 MG tablet Take 50 mg by mouth once as needed for MigraineHistorical Med      traMADol (ULTRAM) 50 MG tablet Take 1 tablet by mouth 2 times daily as needed for Pain, Disp-30 tablet, R-0Print      Dapsone (ACZONE) 5 % GEL Apply to face BID., Disp-90 g, R-2      promethazine (PHENERGAN) 25 MG tablet Take 1 tablet by mouth every 6 hours as needed for Nausea for 30 doses. , Disp-30 tablet, R-1      Misc Natural Products (OSTEO BI-FLEX TRIPLE STRENGTH PO) Take 2 tablets by mouth daily. Acetaminophen (TYLENOL PO) Take  by mouth. As needed for Mild Headaches      cetirizine (ZYRTEC) 10 MG tablet Take 10 mg by mouth daily. !! - Potential duplicate medications found. Please discuss with provider. ALLERGIES     Crestor [rosuvastatin calcium]; Erythromycin; Percocet [oxycodone-acetaminophen]; Relpax [eletriptan];  Tolectin [tolmetin]; and Entex t  [pseudoephedrine-guaifenesin]    FAMILYHISTORY       Family History Problem Relation Age of Onset    High Cholesterol Mother     Heart Disease Mother     Diabetes Mother         Type II    Coronary Art Dis Mother     Neuropathy Mother     Diabetes Father         TypeII    High Cholesterol Father     Lung Cancer Father     Hypertension Father     Alcohol Abuse Father     Cancer Father     High Cholesterol Other           SOCIAL HISTORY       Social History     Tobacco Use    Smoking status: Never Smoker    Smokeless tobacco: Never Used   Substance Use Topics    Alcohol use: Yes     Frequency: Monthly or less     Comment: Rarely    Drug use: No       SCREENINGS             PHYSICAL EXAM    (up to 7 for level 4, 8 or more for level 5)     ED Triage Vitals [10/12/20 1412]   BP Temp Temp Source Pulse Resp SpO2 Height Weight   129/78 98.8 °F (37.1 °C) Oral 108 18 96 % -- --       Physical Exam  Vitals signs reviewed. Constitutional:       Appearance: She is not diaphoretic. HENT:      Right Ear: Tympanic membrane, ear canal and external ear normal. There is no impacted cerumen. Left Ear: Tympanic membrane, ear canal and external ear normal. There is no impacted cerumen. Ears:      Comments: No mastoid tenderness with patient or swelling bilaterally. Nose: No congestion or rhinorrhea. Mouth/Throat:      Mouth: Mucous membranes are moist.      Pharynx: Oropharynx is clear. No oropharyngeal exudate or posterior oropharyngeal erythema. Comments: No sublingual swelling, no trismus. Eyes:      General: No scleral icterus. Conjunctiva/sclera: Conjunctivae normal.   Neck:      Musculoskeletal: Normal range of motion and neck supple. No neck rigidity or muscular tenderness. Cardiovascular:      Rate and Rhythm: Regular rhythm. Tachycardia present. Pulses: Normal pulses. Heart sounds: Normal heart sounds. No murmur. No friction rub. No gallop. Pulmonary:      Effort: Pulmonary effort is normal. No respiratory distress.       Breath sounds: Normal breath sounds. No stridor. No wheezing, rhonchi or rales. Comments: O2 saturation 96% or above throughout her stay in the emergency room. No tachypnea. Mild tachycardia. Abdominal:      General: There is no distension. Palpations: Abdomen is soft. Tenderness: There is no abdominal tenderness. There is no right CVA tenderness, left CVA tenderness, guarding or rebound. Musculoskeletal: Normal range of motion. General: No tenderness. Right lower leg: No edema. Left lower leg: No edema. Skin:     General: Skin is warm and dry. Capillary Refill: Capillary refill takes less than 2 seconds. Neurological:      General: No focal deficit present. Mental Status: She is alert and oriented to person, place, and time. Sensory: No sensory deficit. Motor: No weakness.       Coordination: Coordination normal.      Gait: Gait normal.   Psychiatric:         Mood and Affect: Mood normal.         Behavior: Behavior normal.         DIAGNOSTIC RESULTS   LABS:    Labs Reviewed   COMPREHENSIVE METABOLIC PANEL W/ REFLEX TO MG FOR LOW K - Abnormal; Notable for the following components:       Result Value    Potassium reflex Magnesium 3.3 (*)     Glucose 128 (*)     All other components within normal limits    Narrative:     Performed at:  [place] [address] [email or web address]   Phone [phone]   D-DIMER, QUANTITATIVE - Abnormal; Notable for the following components:    D-Dimer, Quant 408 (*)     All other components within normal limits    Narrative:     Performed at:  [address], [facility] [email or web address]   Phone [phone]   CBC WITH AUTO DIFFERENTIAL    Narrative:     Performed at:  [address], [facility] [email or web address]   Phone [phone]   MAGNESIUM    Narrative:     Performed at:  [address] [place] [address] Laboratory  [address],  [place], [place] [address]   Phone [phone]   TROPONIN    Narrative:     Performed at:  [facility]) [facility]  [address],  [place], [address]   Phone [phone]       All other labs were within normal range or not returned as of this dictation. EKG: All EKG's are interpreted by the Emergency Department Physician in the absence of a cardiologist.  Please see their note for interpretation of EKG. RADIOLOGY:   Non-plain film images such as CT, Ultrasound and MRI are read by the radiologist. Plain radiographic images are visualized and preliminarily interpreted by the ED Provider with the below findings:        Interpretation per the Radiologist below, if available at the time of this note:    CT CHEST PULMONARY EMBOLISM W CONTRAST   Final Result   1. Nonspecific bilateral ground-glass opacities favoring an   infectious/inflammatory process including viral etiologies. XR CHEST PORTABLE   Final Result   Mild increased hazy opacity at the left lung base which could represent   atelectasis, asymmetric edema or developing pneumonia. Recommend clinical   correlation and follow-up. Xr Chest Portable    Result Date: 10/12/2020  EXAMINATION: ONE XRAY VIEW OF THE CHEST 10/12/2020 4:26 pm COMPARISON: None. HISTORY: ORDERING SYSTEM PROVIDED HISTORY: sob TECHNOLOGIST PROVIDED HISTORY: Reason for exam:->sob Reason for Exam: sob Acuity: Acute Type of Exam: Initial FINDINGS: Study limited by overlying support and monitoring apparatus. Heart size is within normal limits. There are increased hazy and interstitial opacities noted at the left base. Osseous structures demonstrate no acute abnormality. Mild increased hazy opacity at the left lung base which could represent atelectasis, asymmetric edema or developing pneumonia. Recommend clinical correlation and follow-up.      Ct Chest Pulmonary Embolism W Contrast    Result Date: 10/12/2020  EXAMINATION: CTA OF THE CHEST 10/12/2020 6:05 pm TECHNIQUE: CTA of the chest was performed after the administration of intravenous contrast.  Multiplanar reformatted images are provided for review. MIP images are provided for review. Dose modulation, iterative reconstruction, and/or weight based adjustment of the mA/kV was utilized to reduce the radiation dose to as low as reasonably achievable. COMPARISON: 10/12/2020 and 05/28/2019 HISTORY: ORDERING SYSTEM PROVIDED HISTORY: r/o Pulmonary Embolism TECHNOLOGIST PROVIDED HISTORY: Reason for exam:->r/o Pulmonary Embolism Reason for Exam: r/o Pulmonary Embolism Acuity: Acute Type of Exam: Initial FINDINGS: Pulmonary Arteries: There is no acute pulmonary thromboembolus. Mediastinum: The heart is unremarkable. There are no enlarged thoracic lymph nodes. Lungs/pleura: The airway is patent. There is no pneumothorax or pleural effusion. There is bibasilar atelectasis. However, there is nonspecific bilateral ground-glass opacities throughout the lungs favoring infectious/inflammatory process, most significant in the left lower lobe. Upper Abdomen: Cholelithiases are present in lumen of the gallbladder. Soft Tissues/Bones: Degenerative changes involve the thoracic spine. 1. Nonspecific bilateral ground-glass opacities favoring an infectious/inflammatory process including viral etiologies.            PROCEDURES   Unless otherwise noted below, none     Procedures    CRITICAL CARE TIME   N/A    CONSULTS:  None      EMERGENCY DEPARTMENT COURSE and DIFFERENTIAL DIAGNOSIS/MDM:   Vitals:    Vitals:    10/12/20 1611 10/12/20 1711 10/12/20 1830 10/12/20 1901   BP: (!) 136/92 132/78 128/81    Pulse: 83 85 93 99   Resp: 20 21 13 25   Temp:       TempSrc:       SpO2: 98% 97% 99% 96%       Patient was given the following medications:  Medications   iopamidol (ISOVUE-370) 76 % injection 75 mL (75 mLs Intravenous Given 10/12/20 1819)           79-year-old female presents emergency department for cough, ear pain, increasing shortness of breath, and a fever on Wednesday of last week. With her mild tachycardia and recent fever, I did elect to order a d-dimer to assess for the possibility of PE. Chest x-ray shows mild opacity in the left lower lung without definitive lobar pneumonia. D-dimer was elevated, CT PE study performed which did not show any evidence of PE but did show bilateral groundglass opacities most consistent with viral pneumonia. I have high suspicion for COVID-19 pneumonia. She does not have hypoxia or severe respiratory distress warranting admission at this time. Appropriate discharge with outpatient follow-up. COVID-19 swab sent. Self quarantine instructions given. Prescription for pulse ox provided, instructed to return to the emergency room immediately for new or worsening symptoms including but not limited to worsening chest pain or shortness of breath, O2 saturations less than 92%, severe nausea or vomiting, any other symptoms she is concerned about. Verbal and written discharge instructions and return precautions given. FINAL IMPRESSION      1. Viral pneumonia    2. Person under investigation for COVID-19          DISPOSITION/PLAN   DISPOSITION Decision To Discharge 10/12/2020 07:04:24 PM      PATIENT REFERREDTO:  Anupam Gutierrez MD  [facility] [address] [place] [address]  [phone]    Call in 1 day        DISCHARGE MEDICATIONS:  Discharge Medication List as of 10/12/2020  7:04 PM      START taking these medications    Details   Misc.  Devices (PULSE OXIMETER FOR FINGER) MISC Please use daily or for worsening shortness of breath., Disp-1 each,R-0Print             DISCONTINUED MEDICATIONS:  Discharge Medication List as of 10/12/2020  7:04 PM                 (Please note that portions of this note were completed with a voice recognition program.  Efforts were made to edit the dictations but occasionally words are mis-transcribed.)    Alana Little JESÚS Calderon (electronically signed)         JESÚS Briggs  10/13/20 6573

## 2020-10-14 LAB — SARS-COV-2, NAA: DETECTED

## 2020-10-15 ENCOUNTER — CARE COORDINATION (OUTPATIENT)
Dept: CARE COORDINATION | Age: 55
End: 2020-10-15

## 2020-10-15 NOTE — CARE COORDINATION
Patient contacted regarding COVID-19 risk and screening. Discussed COVID-19 related testing which was available at this time. Test results were positive. Patient informed of results, if available? Yes. Care Transition Nurse/ Ambulatory Care Manager contacted the patient by telephone to perform follow-up assessment. Verified name and  with patient as identifiers. Patient has following risk factors of: asthma and recent COVID dx, recent ED visit. Symptoms reviewed with patient who verbalized the following symptoms: fatigue, no new symptoms and no worsening symptoms. Due to no new or worsening symptoms encounter was not routed to provider for escalation. Education provided regarding infection prevention, and signs and symptoms of COVID-19 and when to seek medical attention with patient who verbalized understanding. Discussed exposure protocols and quarantine from [address] you at higher risk for severe illness  and given an opportunity for questions and concerns. The patient agrees to contact the COVID-19 hotline [phone] or PCP office for questions related to their healthcare. CTN/ACM provided contact information for future reference. From CDC: Are you at higher risk for severe illness?  Wash your hands often.  Avoid close contact (6 feet, which is about two arm lengths) with people who are sick.  Put distance between yourself and other people if COVID-19 is spreading in your community.  Clean and disinfect frequently touched surfaces.  Avoid all cruise travel and non-essential air travel.  Call your healthcare professional if you have concerns about COVID-19 and your underlying condition or if you are sick. For more information on steps you can take to protect yourself, see CDC's How to Debra for follow-up call in 3-5 days based on severity of symptoms and risk factors. FU appts/Provider:    No future appointments.

## 2020-10-19 ENCOUNTER — CARE COORDINATION (OUTPATIENT)
Dept: CARE COORDINATION | Age: 55
End: 2020-10-19

## 2020-10-20 NOTE — CARE COORDINATION
Patient contacted regarding COVID-19 risk and screening. Discussed COVID-19 related testing which was available at this time. Test results were positive. Patient informed of results, if available? Yes. Pt stated, \"I am going back to work on Friday\". Pt asked ACM if she would be able to go to the doctor with her mother on Thursday. ACM encouraged pt to follow up with her PCP and or the [address] she lives in [facility]) for clarification on her question. Pt stated she is having headaches and her cough is improved. Pt stated she still have slight SOB with isn't back to her baseline. Pt stated her  was tested and his results are negative. Pt's spouse is still sleeping in the same room as pt and has everyday except one day- which he didn't sleep well on the couch per pt (at time of previous call). Pt aware one additional call will be made next week to check on her status. Care Transition Nurse/ Ambulatory Care Manager contacted the patient by telephone to perform follow-up assessment. Verified name and  with patient as identifiers. Patient has following risk factors of: asthma and COVID Dx and recent ED visit. Symptoms reviewed with patient who verbalized the following symptoms: shortness of breath, no worsening symptoms and Headache (taking Tylenol and Motrin prn) and breathing has improved still not back to pts baseline. .      Due to no new or worsening symptoms encounter was not routed to provider for escalation. Education provided regarding infection prevention, and signs and symptoms of COVID-19 and when to seek medical attention with patient who verbalized understanding. Discussed exposure protocols and quarantine from [address] you at higher risk for severe illness  and given an opportunity for questions and concerns. The patient agrees to contact the COVID-19 hotline [phone] or PCP office for questions related to their healthcare.  CTN/ACM

## 2020-10-28 ENCOUNTER — CARE COORDINATION (OUTPATIENT)
Dept: CARE COORDINATION | Age: 55
End: 2020-10-28

## 2020-10-28 NOTE — CARE COORDINATION
You Patient resolved from the Care Transitions episode on 10/28/2020  Discussed COVID-19 related testing which was available at this time. Test results were positive. Patient informed of results, if available? Pt returned to work Friday 10/23/2020    Patient/family has been provided the following resources and education related to COVID-19:                         Signs, symptoms and red flags related to COVID-19            CDC exposure and quarantine guidelines            [employer] exposure contact - [phone]            Contact for their local Department of Health               Patient currently reports that the following symptoms have improved:  fatigue and no new/worsening symptoms     No further outreach scheduled with this CTN/ACM. Episode of Care resolved. Patient has this CTN/ACM contact information if future needs arise. Pt had several questions including:  - Am I able to get my flu vaccine due to still having fatigue and just having no energy? - Should I get my antibodies checked? (Pt's  was asking pt if he needed to be checked- ACM encouraged he should f/u with his PCP)  - How long will the fatigue last? (ACM did explain each person is different and some individuals have less severe symptoms that go away faster and some individuals will have lingering symptoms for weeks to months after a COVID dx)    Message RN at PCP office to address questions related to antibodies and flu vaccine. Pt voiced appreciation for call and message being sent to her PCP's office. Pt aware if she has any questions or concerns to f/u with PCP office directly.

## 2021-03-31 ENCOUNTER — NURSE TRIAGE (OUTPATIENT)
Dept: OTHER | Facility: CLINIC | Age: 56
End: 2021-03-31

## 2021-03-31 NOTE — TELEPHONE ENCOUNTER
Reason for Disposition   [1] Caller requesting NON-URGENT health information AND [2] PCP's office is the best resource    Answer Assessment - Initial Assessment Questions  1. REASON FOR CALL or QUESTION: \"What is your reason for calling today? \" or \"How can I best help you? \" or \"What question do you have that I can help answer? \"      Caller tested positive for Covid in October and is wondering if it is safe to receive vaccine. Advised that per CDC she should get vaccine. But still has questions that we defer to PCP for.     Protocols used: INFORMATION ONLY CALL - NO TRIAGE-ADULT-

## 2021-08-30 ENCOUNTER — HOSPITAL ENCOUNTER (OUTPATIENT)
Dept: WOMENS IMAGING | Age: 56
Discharge: HOME | End: 2021-08-30
Payer: COMMERCIAL

## 2021-08-30 DIAGNOSIS — Z12.31 ENCOUNTER FOR SCREENING MAMMOGRAM FOR BREAST CANCER: ICD-10-CM

## 2021-08-30 PROCEDURE — 77067 SCR MAMMO BI INCL CAD: CPT

## 2021-12-17 PROBLEM — Z00.00 PREVENTATIVE HEALTH CARE: Status: RESOLVED | Noted: 2017-02-28 | Resolved: 2021-12-17

## 2021-12-30 ENCOUNTER — HOSPITAL ENCOUNTER (OUTPATIENT)
Age: 56
Discharge: HOME | End: 2021-12-30
Payer: COMMERCIAL

## 2021-12-30 DIAGNOSIS — E78.00 PURE HYPERCHOLESTEROLEMIA: ICD-10-CM

## 2021-12-30 DIAGNOSIS — Z00.00 PREVENTATIVE HEALTH CARE: ICD-10-CM

## 2021-12-30 DIAGNOSIS — E03.9 HYPOTHYROIDISM, UNSPECIFIED TYPE: ICD-10-CM

## 2021-12-30 LAB
A/G RATIO: 1.6 (ref 1.1–2.2)
ALBUMIN SERPL-MCNC: 4.2 G/DL (ref 3.4–5)
ALP BLD-CCNC: 77 U/L (ref 40–129)
ALT SERPL-CCNC: 24 U/L (ref 10–40)
ANION GAP SERPL CALCULATED.3IONS-SCNC: 10 MMOL/L (ref 3–16)
AST SERPL-CCNC: 24 U/L (ref 15–37)
BASOPHILS ABSOLUTE: 0 K/UL (ref 0–0.2)
BASOPHILS RELATIVE PERCENT: 0.8 %
BILIRUB SERPL-MCNC: <0.2 MG/DL (ref 0–1)
BUN BLDV-MCNC: 16 MG/DL (ref 7–20)
CALCIUM SERPL-MCNC: 9.4 MG/DL (ref 8.3–10.6)
CHLORIDE BLD-SCNC: 110 MMOL/L (ref 99–110)
CHOLESTEROL, TOTAL: 204 MG/DL (ref 0–199)
CO2: 24 MMOL/L (ref 21–32)
CREAT SERPL-MCNC: 0.8 MG/DL (ref 0.6–1.1)
EOSINOPHILS ABSOLUTE: 0.2 K/UL (ref 0–0.6)
EOSINOPHILS RELATIVE PERCENT: 3.2 %
GFR AFRICAN AMERICAN: >60
GFR NON-AFRICAN AMERICAN: >60
GLUCOSE BLD-MCNC: 94 MG/DL (ref 70–99)
HCT VFR BLD CALC: 42.2 % (ref 36–48)
HDLC SERPL-MCNC: 59 MG/DL (ref 40–60)
HEMOGLOBIN: 13.7 G/DL (ref 12–16)
LDL CHOLESTEROL CALCULATED: 132 MG/DL
LYMPHOCYTES ABSOLUTE: 1.8 K/UL (ref 1–5.1)
LYMPHOCYTES RELATIVE PERCENT: 36.7 %
MCH RBC QN AUTO: 28.6 PG (ref 26–34)
MCHC RBC AUTO-ENTMCNC: 32.5 G/DL (ref 31–36)
MCV RBC AUTO: 88 FL (ref 80–100)
MONOCYTES ABSOLUTE: 0.3 K/UL (ref 0–1.3)
MONOCYTES RELATIVE PERCENT: 6.1 %
NEUTROPHILS ABSOLUTE: 2.6 K/UL (ref 1.7–7.7)
NEUTROPHILS RELATIVE PERCENT: 53.2 %
PDW BLD-RTO: 14 % (ref 12.4–15.4)
PLATELET # BLD: 202 K/UL (ref 135–450)
PMV BLD AUTO: 7.6 FL (ref 5–10.5)
POTASSIUM SERPL-SCNC: 3.8 MMOL/L (ref 3.5–5.1)
RBC # BLD: 4.8 M/UL (ref 4–5.2)
SODIUM BLD-SCNC: 144 MMOL/L (ref 136–145)
T4 FREE: 1.1 NG/DL (ref 0.9–1.8)
TOTAL PROTEIN: 6.8 G/DL (ref 6.4–8.2)
TRIGL SERPL-MCNC: 65 MG/DL (ref 0–150)
TSH SERPL DL<=0.05 MIU/L-ACNC: 1.36 UIU/ML (ref 0.27–4.2)
VLDLC SERPL CALC-MCNC: 13 MG/DL
WBC # BLD: 4.9 K/UL (ref 4–11)

## 2021-12-30 PROCEDURE — 84443 ASSAY THYROID STIM HORMONE: CPT

## 2021-12-30 PROCEDURE — 36415 COLL VENOUS BLD VENIPUNCTURE: CPT

## 2021-12-30 PROCEDURE — 80061 LIPID PANEL: CPT

## 2021-12-30 PROCEDURE — 84439 ASSAY OF FREE THYROXINE: CPT

## 2021-12-30 PROCEDURE — 85025 COMPLETE CBC W/AUTO DIFF WBC: CPT

## 2021-12-30 PROCEDURE — 80053 COMPREHEN METABOLIC PANEL: CPT

## 2022-04-20 ENCOUNTER — HOSPITAL ENCOUNTER (OUTPATIENT)
Age: 57
Discharge: HOME | End: 2022-04-20
Payer: COMMERCIAL

## 2022-04-20 LAB
A/G RATIO: 1.9 (ref 1.1–2.2)
ALBUMIN SERPL-MCNC: 4.5 G/DL (ref 3.4–5)
ALP BLD-CCNC: 79 U/L (ref 40–129)
ALT SERPL-CCNC: 30 U/L (ref 10–40)
ANION GAP SERPL CALCULATED.3IONS-SCNC: 12 MMOL/L (ref 3–16)
AST SERPL-CCNC: 30 U/L (ref 15–37)
BILIRUB SERPL-MCNC: 0.4 MG/DL (ref 0–1)
BUN BLDV-MCNC: 15 MG/DL (ref 7–20)
CALCIUM SERPL-MCNC: 9.6 MG/DL (ref 8.3–10.6)
CHLORIDE BLD-SCNC: 111 MMOL/L (ref 99–110)
CHOLESTEROL, TOTAL: 186 MG/DL (ref 0–199)
CO2: 23 MMOL/L (ref 21–32)
CREAT SERPL-MCNC: 0.7 MG/DL (ref 0.6–1.1)
FOLATE: >20 NG/ML (ref 4.78–24.2)
GFR AFRICAN AMERICAN: >60
GFR NON-AFRICAN AMERICAN: >60
GLUCOSE BLD-MCNC: 90 MG/DL (ref 70–99)
HDLC SERPL-MCNC: 56 MG/DL (ref 40–60)
HOMOCYSTEINE: 6 UMOL/L (ref 0–10)
LDL CHOLESTEROL CALCULATED: 112 MG/DL
MAGNESIUM: 2.2 MG/DL (ref 1.8–2.4)
POTASSIUM SERPL-SCNC: 4.5 MMOL/L (ref 3.5–5.1)
SODIUM BLD-SCNC: 146 MMOL/L (ref 136–145)
TOTAL PROTEIN: 6.9 G/DL (ref 6.4–8.2)
TRIGL SERPL-MCNC: 92 MG/DL (ref 0–150)
VITAMIN B-12: >2000 PG/ML (ref 211–911)
VITAMIN D 25-HYDROXY: 71.5 NG/ML
VLDLC SERPL CALC-MCNC: 18 MG/DL

## 2022-04-20 PROCEDURE — 83921 ORGANIC ACID SINGLE QUANT: CPT

## 2022-04-20 PROCEDURE — 82306 VITAMIN D 25 HYDROXY: CPT

## 2022-04-20 PROCEDURE — 82542 COL CHROMOTOGRAPHY QUAL/QUAN: CPT

## 2022-04-20 PROCEDURE — 83090 ASSAY OF HOMOCYSTEINE: CPT

## 2022-04-20 PROCEDURE — 82607 VITAMIN B-12: CPT

## 2022-04-20 PROCEDURE — 82746 ASSAY OF FOLIC ACID SERUM: CPT

## 2022-04-20 PROCEDURE — 36415 COLL VENOUS BLD VENIPUNCTURE: CPT

## 2022-04-20 PROCEDURE — 80061 LIPID PANEL: CPT

## 2022-04-20 PROCEDURE — 83735 ASSAY OF MAGNESIUM: CPT

## 2022-04-20 PROCEDURE — 80053 COMPREHEN METABOLIC PANEL: CPT

## 2022-04-23 LAB — METHYLMALONIC ACID: 0.19 UMOL/L (ref 0–0.4)

## 2022-04-24 LAB — CO-ENZYME Q10: 2.2 MG/L (ref 0.4–1.6)

## 2022-05-11 NOTE — TELEPHONE ENCOUNTER
Tried calling patient no answer. Home number is no longer working and cell number rang and her voicemail is full. dry, intact, no bleeding and no hematoma.

## 2022-10-18 ENCOUNTER — HOSPITAL ENCOUNTER (OUTPATIENT)
Dept: WOMENS IMAGING | Age: 57
Discharge: HOME | End: 2022-10-18
Payer: COMMERCIAL

## 2022-10-18 DIAGNOSIS — Z12.31 ENCOUNTER FOR SCREENING MAMMOGRAM FOR BREAST CANCER: ICD-10-CM

## 2022-10-18 PROCEDURE — 77063 BREAST TOMOSYNTHESIS BI: CPT

## 2022-11-14 ENCOUNTER — OFFICE VISIT (OUTPATIENT)
Dept: ENT CLINIC | Age: 57
End: 2022-11-14
Payer: COMMERCIAL

## 2022-11-14 VITALS — RESPIRATION RATE: 16 BRPM | BODY MASS INDEX: 23.49 KG/M2 | HEIGHT: 68 IN | TEMPERATURE: 97.2 F | WEIGHT: 155 LBS

## 2022-11-14 DIAGNOSIS — M26.623 BILATERAL TEMPOROMANDIBULAR JOINT PAIN: Primary | ICD-10-CM

## 2022-11-14 DIAGNOSIS — M27.0 TORUS MANDIBULARIS: ICD-10-CM

## 2022-11-14 PROCEDURE — 99204 OFFICE O/P NEW MOD 45 MIN: CPT | Performed by: OTOLARYNGOLOGY

## 2022-11-14 RX ORDER — VITAMIN B COMPLEX
1 CAPSULE ORAL DAILY
COMMUNITY

## 2022-11-14 ASSESSMENT — ENCOUNTER SYMPTOMS
TROUBLE SWALLOWING: 0
SHORTNESS OF BREATH: 0
VOICE CHANGE: 0
SINUS PRESSURE: 0
WHEEZING: 0
SINUS PAIN: 0
SORE THROAT: 0
ABDOMINAL PAIN: 0

## 2022-11-14 NOTE — PROGRESS NOTES
Salome Gaines 89, [address], [facility] Josef Treadwell  P: [phone]       Patient     Derryl Mortimer  1965    Chief Concern     Chief Complaint   Patient presents with    New Patient     Patient states that she is here for ear/ jaw pain- States that she has a mouth guard which is new, states that it is thicker than her previous one. States that he jaw/ ears started to hurt after new mouth guard, has since switched back to her old one. Assessment and Plan      Diagnosis Orders   1. Bilateral temporomandibular joint pain        2. Torus mandibularis            22-year-old female with complex history of chronic pain with concern for ear/jaw pain, left worse-has history of migraines, also has had cervical spinal fusions, osteoarthritis of the knees-on examination we appreciate tenderness to palpation of the left temporomandibular joints; she has a mouthguard but has mandibular elder which may make fitting difficult. We have counseled her on conservative recommendations for temporomandibular joint arthralgia, she will discuss shaving down her mouthguard with her dentist, will consider sending her to [facility] if no improvement. Return if symptoms worsen or fail to improve. History of Present Illness     56 y.o. female with concern for bilateral ear and jaw pain ongoing for the past several months, intermittent, worst pain is 8/10, \"annoying\" - radiating down the neck and into the jaw . Wears a mouth guard due to prior dental work, with concern for temporomandibular joint arthralgia. Has been using the bite guard for at least 15 years - new guard is thicker and feels she has a lot of \"crowding\" - old bite guard significantly thinner. Has history of ACDF surgery C5-6, C6-7, adenotonsillectomy 2005, septoplasty 1998, gets occasional occipital pain (migraines) roughly 3-4x/month, every since switching to nurtec 2 months ago. Sees Dakota Martin at [facility].  States morning migraines. No hearing loss, tinnitus, no vertigo but intermittent dysequilibrium.      Past Medical History     Past Medical History:   Diagnosis Date    Allergic rhinitis     Asthma     Mild, intermittent    COVID-19 10/12/2020    Deviated septum 1998    Headache(784.0)     Hyperlipidemia     Osteoarthritis     Thoracic outlet syndrome 1987    TMJ (temporomandibular joint disorder)     TMJ dysfunction        Past Surgical History     Past Surgical History:   Procedure Laterality Date    CERVICAL DISCECTOMY  2011    lt C5-6--dr gonzalez      SECTION  3978,1311    COLONOSCOPY  2017    dr white--kaden      HYSTERECTOMY (CERVIX STATUS UNKNOWN)  2013    KNEE ARTHROSCOPY  8976,2329    Bilat knees    NASAL SEPTUM SURGERY      OTHER SURGICAL HISTORY  1985    Thoracic Outlet Syndrome    SINUS SURGERY      SPINE SURGERY  ,    C6-C7 discectomy/fusion,C5-C6    TONSILLECTOMY AND ADENOIDECTOMY  2005    TONSILLECTOMY AND ADENOIDECTOMY         Family History     Family History   Problem Relation Age of Onset    High Cholesterol Mother     Heart Disease Mother     Diabetes Mother         Type II    Coronary Art Dis Mother     Neuropathy Mother     Diabetes Father         TypeII    High Cholesterol Father     Lung Cancer Father     Hypertension Father     Alcohol Abuse Father     Cancer Father     High Cholesterol Other        Social History     Social History     Tobacco Use    Smoking status: Never    Smokeless tobacco: Never   Vaping Use    Vaping Use: Never used   Substance Use Topics    Alcohol use: Yes     Comment: Rarely    Drug use: No        Allergies     Allergies   Allergen Reactions    Crestor [Rosuvastatin Calcium]      myalgias    Erythromycin      Rash    Percocet [Oxycodone-Acetaminophen] Nausea Only    Relpax [Eletriptan]     Tolectin [Tolmetin] Nausea Only    Entex T  [Pseudoephedrine-Guaifenesin] Rash       Medications     Current Outpatient Medications   Medication Sig Dispense Refill    Azelastine HCl 137 MCG/SPRAY SOLN SPRAY ONE OR TWO SPRAYS IN EACH NOSTRIL DAILY 30 mL 3    Naproxen Sodium (ALEVE PO) Take by mouth      b complex vitamins capsule Take 1 capsule by mouth daily      dicyclomine (BENTYL) 10 MG capsule TAKE ONE CAPSULE BY MOUTH THREE TIMES A DAY AS NEEDED FOR MUSCLE SPASMS 90 capsule 1    escitalopram (LEXAPRO) 5 MG tablet Take 1 tablet by mouth daily 30 tablet 1    fluticasone (FLONASE) 50 MCG/ACT nasal spray SPRAY TWO SPRAYS IN EACH NOSTRIL ONCE DAILY 16 g 1    Coenzyme Q10 (COQ10) 100 MG CAPS Take by mouth Takes 3x per week      Multiple Vitamins-Minerals (MULTIPLE VITAMINS/WOMENS PO) Take by mouth      COLLAGEN PO Take by mouth      Misc. Devices (PULSE OXIMETER FOR FINGER) MISC Please use daily or for worsening shortness of breath. 1 each 0    pitavastatin (LIVALO) 2 MG TABS tablet TAKE 1 TABLET DAILY (Patient taking differently: TAKE 1 TABLET ever other day) 30 tablet 1    Icosapent Ethyl (VASCEPA) 1 g CAPS capsule Take 2 capsules by mouth 2 times daily 120 capsule 1    vitamin D (ERGOCALCIFEROL) 12617 units CAPS capsule TAKE ONE CAPSULE BY MOUTH every 10 days 6 capsule 1    Misc Natural Products (OSTEO BI-FLEX TRIPLE STRENGTH PO) Take 2 tablets by mouth daily. Acetaminophen (TYLENOL PO) Take  by mouth. As needed for Mild Headaches      cetirizine (ZYRTEC) 10 MG tablet Take 10 mg by mouth daily. fluconazole (DIFLUCAN) 150 MG tablet Take one tablet oral route today and repeat in 3 days if symptoms persist (Patient not taking: Reported on 11/14/2022) 2 tablet 0     No current facility-administered medications for this visit. Review of Systems     Review of Systems   Constitutional:  Negative for activity change, chills, diaphoresis, fatigue and fever. HENT:  Positive for dental problem and ear pain. Negative for congestion, ear discharge, hearing loss, sinus pressure, sinus pain, sore throat, tinnitus, trouble swallowing and voice change. Eyes:  Negative for visual disturbance. Respiratory:  Negative for shortness of breath and wheezing. Cardiovascular:  Negative for chest pain. Gastrointestinal:  Negative for abdominal pain. Musculoskeletal:  Negative for neck pain and neck stiffness. Skin:  Negative for rash. Neurological:  Positive for dizziness and headaches. Negative for light-headedness. Hematological:  Negative for adenopathy. PhysicalExam     Vitals:    11/14/22 1411   Resp: 16   Temp: 97.2 °F (36.2 °C)   TempSrc: Infrared   Weight: 155 lb (70.3 kg)   Height: 5' 8\" (1.727 m)       Physical Exam  Vitals reviewed. Constitutional:       Appearance: Normal appearance. HENT:      Head: Normocephalic and atraumatic. Right Ear: Tympanic membrane, ear canal and external ear normal. There is no impacted cerumen. Left Ear: Tympanic membrane, ear canal and external ear normal. There is no impacted cerumen. Ears:      Anderson exam findings: Does not lateralize. Right Rinne: AC > BC. Left Rinne: AC > BC. Comments: Tenderness to palpation of the bilateral temporomandibular joints     Nose: No congestion or rhinorrhea. Mouth/Throat:      Lips: Pink. No lesions. Mouth: Mucous membranes are moist. No oral lesions. Tongue: No lesions. Tongue does not deviate from midline. Palate: No mass and lesions. Pharynx: Oropharynx is clear. Uvula midline. No oropharyngeal exudate or posterior oropharyngeal erythema. Comments: Torus mandibularis noted bilaterally, tenderness to palpation of the bilateral mandibular ramus, left worse than right  Eyes:      Extraocular Movements: Extraocular movements intact. Pupils: Pupils are equal, round, and reactive to light. Musculoskeletal:      Cervical back: Normal range of motion and neck supple. Lymphadenopathy:      Cervical: No cervical adenopathy. Neurological:      Mental Status: She is alert.       Cranial Nerves: No cranial nerve deficit. Data Review        Procedure     None    Rolando Allen MD  11/14/22    Portions of this note were dictated using Dragon.  There may be linguistic errors secondary to the use of this program.

## 2022-11-14 NOTE — PATIENT INSTRUCTIONS
Temporomandibular Joint Arthralgia (suspected) guidelines:  -Ibuprofen 600mg q8h x 7 days (may exacerbate LPR/gastroesophageal reflux), also consider Tylenol 500mg q6-8h x 7d  -Soft non chewing diet x 7-14 days  -Warm compresses to the bilateral temporomandibular joints every 4 hours x 7 days  -[employer] message in 1 weeks    Suspected Migraine Instructions:  -Cut caffeine to 0.5-1 cup/day over the next 2 weeks  -Drink 1.5-2L of water daily (48-64oz/day)  -Take magnesium, riboflavin supplements daily  -For headaches, may take 500mg acetaminophen (Tylenol) every 6 hours as needed or 600mg ibuprofen every 6 hours as needed  -Keep migraine/dysequilibrium log

## 2023-03-15 ENCOUNTER — HOSPITAL ENCOUNTER (OUTPATIENT)
Age: 58
Setting detail: SPECIMEN
Discharge: HOME | End: 2023-03-15
Payer: COMMERCIAL

## 2023-03-15 LAB
25(OH)D3 SERPL-MCNC: 82 NG/ML
ALBUMIN SERPL-MCNC: 4.2 G/DL (ref 3.4–5)
ALBUMIN/GLOB SERPL: 1.8 {RATIO} (ref 1.1–2.2)
ALP SERPL-CCNC: 73 U/L (ref 40–129)
ALT SERPL-CCNC: 19 U/L (ref 10–40)
ANION GAP SERPL CALCULATED.3IONS-SCNC: 12 MMOL/L (ref 3–16)
AST SERPL-CCNC: 23 U/L (ref 15–37)
BILIRUB SERPL-MCNC: 0.4 MG/DL (ref 0–1)
BUN SERPL-MCNC: 18 MG/DL (ref 7–20)
CALCIUM SERPL-MCNC: 9.4 MG/DL (ref 8.3–10.6)
CHLORIDE SERPL-SCNC: 108 MMOL/L (ref 99–110)
CHOLEST SERPL-MCNC: 212 MG/DL (ref 0–199)
CK SERPL-CCNC: 148 U/L (ref 26–192)
CO2 SERPL-SCNC: 24 MMOL/L (ref 21–32)
CREAT SERPL-MCNC: 0.9 MG/DL (ref 0.6–1.1)
FOLATE SERPL-MCNC: >20 NG/ML (ref 4.78–24.2)
GFR SERPLBLD CREATININE-BSD FMLA CKD-EPI: >60 ML/MIN/{1.73_M2}
GLUCOSE SERPL-MCNC: 95 MG/DL (ref 70–99)
HCYS SERPL-SCNC: 6 UMOL/L (ref 0–10)
HDLC SERPL-MCNC: 56 MG/DL (ref 40–60)
LDLC SERPL CALC-MCNC: 141 MG/DL
MAGNESIUM SERPL-MCNC: 2.2 MG/DL (ref 1.8–2.4)
POTASSIUM SERPL-SCNC: 4 MMOL/L (ref 3.5–5.1)
PROT SERPL-MCNC: 6.6 G/DL (ref 6.4–8.2)
SODIUM SERPL-SCNC: 144 MMOL/L (ref 136–145)
TRIGL SERPL-MCNC: 77 MG/DL (ref 0–150)
VIT B12 SERPL-MCNC: >2000 PG/ML (ref 211–911)
VLDLC SERPL CALC-MCNC: 15 MG/DL

## 2023-03-15 PROCEDURE — 80061 LIPID PANEL: CPT

## 2023-03-15 PROCEDURE — 82746 ASSAY OF FOLIC ACID SERUM: CPT

## 2023-03-15 PROCEDURE — 83921 ORGANIC ACID SINGLE QUANT: CPT

## 2023-03-15 PROCEDURE — 82542 COL CHROMOTOGRAPHY QUAL/QUAN: CPT

## 2023-03-15 PROCEDURE — 83090 ASSAY OF HOMOCYSTEINE: CPT

## 2023-03-15 PROCEDURE — 82306 VITAMIN D 25 HYDROXY: CPT

## 2023-03-15 PROCEDURE — 83735 ASSAY OF MAGNESIUM: CPT

## 2023-03-15 PROCEDURE — 82550 ASSAY OF CK (CPK): CPT

## 2023-03-15 PROCEDURE — 80053 COMPREHEN METABOLIC PANEL: CPT

## 2023-03-15 PROCEDURE — 82607 VITAMIN B-12: CPT

## 2023-03-15 PROCEDURE — 36415 COLL VENOUS BLD VENIPUNCTURE: CPT

## 2023-03-17 LAB — METHYLMALONATE SERPL-SCNC: 0.16 UMOL/L (ref 0–0.4)

## 2023-03-20 LAB — UBIQUINONE10 SERPL-MCNC: 1.5 MG/L (ref 0.4–1.6)

## 2023-04-21 PROBLEM — Z00.00 PREVENTATIVE HEALTH CARE: Status: RESOLVED | Noted: 2017-02-28 | Resolved: 2023-04-21

## 2023-12-06 ENCOUNTER — HOSPITAL ENCOUNTER (OUTPATIENT)
Dept: WOMENS IMAGING | Age: 58
Discharge: HOME | End: 2023-12-06
Payer: COMMERCIAL

## 2023-12-06 VITALS — WEIGHT: 158 LBS | BODY MASS INDEX: 23.95 KG/M2 | HEIGHT: 68 IN

## 2023-12-06 DIAGNOSIS — Z12.31 VISIT FOR SCREENING MAMMOGRAM: ICD-10-CM

## 2023-12-06 PROCEDURE — 77063 BREAST TOMOSYNTHESIS BI: CPT

## 2024-01-03 ENCOUNTER — HOSPITAL ENCOUNTER (OUTPATIENT)
Age: 59
Discharge: HOME | End: 2024-01-03
Payer: COMMERCIAL

## 2024-01-03 LAB
25(OH)D3 SERPL-MCNC: 76.4 NG/ML
ALBUMIN SERPL-MCNC: 4.2 G/DL (ref 3.4–5)
ALBUMIN/GLOB SERPL: 1.6 {RATIO} (ref 1.1–2.2)
ALP SERPL-CCNC: 78 U/L (ref 40–129)
ALT SERPL-CCNC: 23 U/L (ref 10–40)
ANION GAP SERPL CALCULATED.3IONS-SCNC: 8 MMOL/L (ref 3–16)
AST SERPL-CCNC: 26 U/L (ref 15–37)
BILIRUB SERPL-MCNC: 0.4 MG/DL (ref 0–1)
BUN SERPL-MCNC: 17 MG/DL (ref 7–20)
CALCIUM SERPL-MCNC: 9.2 MG/DL (ref 8.3–10.6)
CHLORIDE SERPL-SCNC: 107 MMOL/L (ref 99–110)
CHOLEST SERPL-MCNC: 244 MG/DL (ref 0–199)
CK SERPL-CCNC: 143 U/L (ref 26–192)
CO2 SERPL-SCNC: 26 MMOL/L (ref 21–32)
CREAT SERPL-MCNC: 0.9 MG/DL (ref 0.6–1.1)
FOLATE SERPL-MCNC: >20 NG/ML (ref 4.78–24.2)
GFR SERPLBLD CREATININE-BSD FMLA CKD-EPI: >60 ML/MIN/{1.73_M2}
GLUCOSE SERPL-MCNC: 81 MG/DL (ref 70–99)
HCYS SERPL-SCNC: 7 UMOL/L (ref 0–10)
HDLC SERPL-MCNC: 63 MG/DL (ref 40–60)
LDLC SERPL CALC-MCNC: 164 MG/DL
MAGNESIUM SERPL-MCNC: 2.4 MG/DL (ref 1.8–2.4)
POTASSIUM SERPL-SCNC: 4.1 MMOL/L (ref 3.5–5.1)
PROT SERPL-MCNC: 6.8 G/DL (ref 6.4–8.2)
SODIUM SERPL-SCNC: 141 MMOL/L (ref 136–145)
TRIGL SERPL-MCNC: 85 MG/DL (ref 0–150)
TSH SERPL DL<=0.005 MIU/L-ACNC: 0.96 UIU/ML (ref 0.27–4.2)
VIT B12 SERPL-MCNC: >2000 PG/ML (ref 211–911)
VLDLC SERPL CALC-MCNC: 17 MG/DL

## 2024-01-03 PROCEDURE — 82607 VITAMIN B-12: CPT

## 2024-01-03 PROCEDURE — 83090 ASSAY OF HOMOCYSTEINE: CPT

## 2024-01-03 PROCEDURE — 80061 LIPID PANEL: CPT

## 2024-01-03 PROCEDURE — 82542 COL CHROMOTOGRAPHY QUAL/QUAN: CPT

## 2024-01-03 PROCEDURE — 82306 VITAMIN D 25 HYDROXY: CPT

## 2024-01-03 PROCEDURE — 83921 ORGANIC ACID SINGLE QUANT: CPT

## 2024-01-03 PROCEDURE — 82746 ASSAY OF FOLIC ACID SERUM: CPT

## 2024-01-03 PROCEDURE — 36415 COLL VENOUS BLD VENIPUNCTURE: CPT

## 2024-01-03 PROCEDURE — 84443 ASSAY THYROID STIM HORMONE: CPT

## 2024-01-03 PROCEDURE — 80053 COMPREHEN METABOLIC PANEL: CPT

## 2024-01-03 PROCEDURE — 83735 ASSAY OF MAGNESIUM: CPT

## 2024-01-03 PROCEDURE — 82550 ASSAY OF CK (CPK): CPT

## 2024-01-06 LAB — METHYLMALONATE SERPL-SCNC: 0.11 UMOL/L (ref 0–0.4)

## 2024-01-07 LAB — UBIQUINONE10 SERPL-MCNC: 2.8 MG/L (ref 0.4–1.6)

## 2024-02-19 PROBLEM — H92.09 OTALGIA: Status: ACTIVE | Noted: 2024-02-19

## 2024-02-21 PROBLEM — M89.8X1 PAIN OF RIGHT CLAVICLE: Status: RESOLVED | Noted: 2019-04-30 | Resolved: 2024-02-21

## 2024-05-05 PROBLEM — Z00.00 PREVENTATIVE HEALTH CARE: Status: RESOLVED | Noted: 2017-02-28 | Resolved: 2024-05-05

## 2024-07-19 PROBLEM — Z00.00 PREVENTATIVE HEALTH CARE: Status: RESOLVED | Noted: 2017-02-28 | Resolved: 2024-07-19

## 2024-08-28 PROBLEM — Z00.00 PREVENTATIVE HEALTH CARE: Status: RESOLVED | Noted: 2017-02-28 | Resolved: 2024-08-28

## 2024-12-18 ENCOUNTER — HOSPITAL ENCOUNTER (OUTPATIENT)
Dept: WOMENS IMAGING | Age: 59
Discharge: HOME | End: 2024-12-18
Payer: COMMERCIAL

## 2024-12-18 VITALS — HEIGHT: 68 IN | BODY MASS INDEX: 24.25 KG/M2 | WEIGHT: 160 LBS

## 2024-12-18 DIAGNOSIS — Z12.31 ENCOUNTER FOR SCREENING MAMMOGRAM FOR MALIGNANT NEOPLASM OF BREAST: ICD-10-CM

## 2024-12-18 PROCEDURE — 77067 SCR MAMMO BI INCL CAD: CPT

## 2025-01-03 ENCOUNTER — OFFICE VISIT (OUTPATIENT)
Age: 60
End: 2025-01-03

## 2025-01-03 VITALS
SYSTOLIC BLOOD PRESSURE: 118 MMHG | HEART RATE: 86 BPM | TEMPERATURE: 98.6 F | DIASTOLIC BLOOD PRESSURE: 80 MMHG | OXYGEN SATURATION: 98 %

## 2025-01-03 DIAGNOSIS — S50.01XA CONTUSION OF RIGHT ELBOW, INITIAL ENCOUNTER: ICD-10-CM

## 2025-01-03 DIAGNOSIS — M62.830 BACK MUSCLE SPASM: ICD-10-CM

## 2025-01-03 DIAGNOSIS — S80.02XA CONTUSION OF LEFT KNEE, INITIAL ENCOUNTER: Primary | ICD-10-CM

## 2025-01-03 DIAGNOSIS — M54.2 CERVICALGIA: ICD-10-CM

## 2025-01-03 DIAGNOSIS — S29.012A STRAIN OF THORACIC BACK REGION: ICD-10-CM
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
## 2025-01-03 DIAGNOSIS — S80.212A ABRASION OF LEFT KNEE, INITIAL ENCOUNTER: ICD-10-CM

## 2025-01-03 DIAGNOSIS — S29.012A STRAIN OF MID-BACK, INITIAL ENCOUNTER: ICD-10-CM

## 2025-01-03 RX ORDER — METHYLPREDNISOLONE 4 MG/1
TABLET ORAL
Qty: 1 KIT | Refills: 0 | Status: SHIPPED | OUTPATIENT
Start: 2025-01-03 | End: 2025-01-09

## 2025-01-03 RX ORDER — METHOCARBAMOL 750 MG/1
750 TABLET, FILM COATED ORAL 4 TIMES DAILY
Qty: 40 TABLET | Refills: 0 | Status: SHIPPED | OUTPATIENT
Start: 2025-01-03 | End: 2025-01-13

## 2025-01-03 RX ORDER — DEXAMETHASONE SODIUM PHOSPHATE 4 MG/ML
4 INJECTION, SOLUTION INTRA-ARTICULAR; INTRALESIONAL; INTRAMUSCULAR; INTRAVENOUS; SOFT TISSUE ONCE
Status: DISCONTINUED | OUTPATIENT
Start: 2025-01-03 | End: 2025-01-04

## 2025-01-03 ASSESSMENT — ENCOUNTER SYMPTOMS: BACK PAIN: 1

## 2025-01-03 NOTE — PATIENT INSTRUCTIONS
Work Related injuries  Intramuscular injection Dexamethasone administered for to help with the pain and inflammation   Medrol dose pack prescribed for treatment of the inflammation- start tomorrow  Muscle relaxer ( Robaxin) provided for noted muscle spasms on exam. Warning against medication induced drowsiness provided.  Acetaminophen (tylenol) for pain management while taking steroids  Referral to Occupational Health provided for evaluation -return to work clearance  [facility] FROI form completed   Apply ice to affected areas for 20 minutes at a time several times a day  Keep knee and elbow abrasion clean, fito daily with soap and water, apply antibacterial ointment and cover with a band aid until scabbed over

## 2025-01-03 NOTE — PROGRESS NOTES
Delphine Portillo (: 1965) is a 59 y.o. female, New patient, here for evaluation of the following chief complaint(s):  Work Related Injury (Fell at work yesterday, tripped and face planted while running for [employer] , states that she is having left knee pain , and Right elbow , and back pain , states that she has a pounding headache )      ASSESSMENT/PLAN:    ICD-10-CM    1. Contusion of left knee, initial encounter  S80.02XA [facility]      2. Abrasion of left knee, initial encounter  S80.212A [facility] [employer]      3. Strain of thoracic back region  S29.012A methylPREDNISolone (MEDROL DOSEPACK) 4 MG tablet     [facility]     dexAMETHasone (DECADRON) injection 4 mg     DISCONTINUED: dexAMETHasone (DECADRON) injection 4 mg      4. Contusion of right elbow, initial encounter  S50.01XA [facility] [employer]      5. Work related injury  Y99.0 [facility] [employer]      6. Back muscle spasm  M62.830 methocarbamol (ROBAXIN) 750 MG tablet     [facility] [employer]      7. Cervicalgia  M54.2 methylPREDNISolone (MEDROL DOSEPACK) 4 MG tablet     methocarbamol (ROBAXIN) 750 MG tablet     [facility]     dexAMETHasone (DECADRON) injection 4 mg     DISCONTINUED: dexAMETHasone (DECADRON) injection 4 mg      8. Strain of mid-back, initial encounter  S29.012A           Work Related injuries  Intramuscular injection Dexamethasone administered for to help with the pain and inflammation   Medrol dose pack prescribed for treatment of the inflammation- start tomorrow  Muscle relaxer ( Robaxin) provided for noted muscle spasms on exam. Warning against medication induced drowsiness provided.  Acetaminophen (tylenol) for pain management while taking steroids  Referral to Occupational Health provided for evaluation -return to work clearance  [facility] FROI form completed   Discussed PCP follow up for

## 2025-01-04 RX ORDER — DEXAMETHASONE SODIUM PHOSPHATE 4 MG/ML
4 INJECTION, SOLUTION INTRA-ARTICULAR; INTRALESIONAL; INTRAMUSCULAR; INTRAVENOUS; SOFT TISSUE ONCE
Status: SHIPPED | OUTPATIENT
Start: 2025-01-04

## 2025-01-07 ENCOUNTER — HOSPITAL ENCOUNTER (OUTPATIENT)
Dept: GENERAL RADIOLOGY | Age: 60
Discharge: HOME | End: 2025-01-07
Payer: COMMERCIAL

## 2025-01-07 DIAGNOSIS — S29.012A STRAIN, DORSAL: ICD-10-CM

## 2025-01-07 DIAGNOSIS — S80.02XA CONTUSION OF LEFT KNEE, INITIAL ENCOUNTER: ICD-10-CM

## 2025-01-07 DIAGNOSIS — S80.212A ABRASION OF KNEE, LEFT, INITIAL ENCOUNTER: ICD-10-CM

## 2025-01-07 PROCEDURE — 73560 X-RAY EXAM OF KNEE 1 OR 2: CPT
